# Patient Record
Sex: FEMALE | Race: WHITE | Employment: OTHER | URBAN - METROPOLITAN AREA
[De-identification: names, ages, dates, MRNs, and addresses within clinical notes are randomized per-mention and may not be internally consistent; named-entity substitution may affect disease eponyms.]

---

## 2017-01-03 RX ORDER — MEMANTINE HYDROCHLORIDE 7 MG/1
7 CAPSULE, EXTENDED RELEASE ORAL DAILY
Qty: 90 CAPSULE | Refills: 1 | Status: SHIPPED | OUTPATIENT
Start: 2017-01-03 | End: 2017-06-14 | Stop reason: SDUPTHER

## 2017-01-04 ENCOUNTER — TELEPHONE (OUTPATIENT)
Dept: NEUROLOGY | Age: 72
End: 2017-01-04

## 2017-03-13 ENCOUNTER — TELEPHONE (OUTPATIENT)
Dept: FAMILY MEDICINE CLINIC | Age: 72
End: 2017-03-13

## 2017-03-13 ENCOUNTER — TELEPHONE (OUTPATIENT)
Dept: NEUROLOGY | Age: 72
End: 2017-03-13

## 2017-03-24 ENCOUNTER — OFFICE VISIT (OUTPATIENT)
Dept: FAMILY MEDICINE CLINIC | Age: 72
End: 2017-03-24

## 2017-03-24 VITALS
RESPIRATION RATE: 16 BRPM | WEIGHT: 131 LBS | OXYGEN SATURATION: 97 % | SYSTOLIC BLOOD PRESSURE: 118 MMHG | TEMPERATURE: 98.1 F | BODY MASS INDEX: 24.11 KG/M2 | HEART RATE: 78 BPM | HEIGHT: 62 IN | DIASTOLIC BLOOD PRESSURE: 72 MMHG

## 2017-03-24 DIAGNOSIS — I10 ESSENTIAL HYPERTENSION: Primary | ICD-10-CM

## 2017-03-24 DIAGNOSIS — F02.80 ALZHEIMER'S DISEASE OF OTHER ONSET: ICD-10-CM

## 2017-03-24 DIAGNOSIS — E78.5 HYPERLIPIDEMIA, UNSPECIFIED HYPERLIPIDEMIA TYPE: ICD-10-CM

## 2017-03-24 DIAGNOSIS — G30.8 ALZHEIMER'S DISEASE OF OTHER ONSET: ICD-10-CM

## 2017-03-24 DIAGNOSIS — Z72.0 TOBACCO USE: ICD-10-CM

## 2017-03-24 DIAGNOSIS — M15.9 PRIMARY OSTEOARTHRITIS INVOLVING MULTIPLE JOINTS: ICD-10-CM

## 2017-03-24 LAB
ALBUMIN SERPL-MCNC: 4.4 G/DL (ref 3.4–5)
ALP BLD-CCNC: 75 U/L (ref 40–129)
ALT SERPL-CCNC: 11 U/L (ref 10–40)
ANION GAP SERPL CALCULATED.3IONS-SCNC: 13 MMOL/L (ref 3–16)
AST SERPL-CCNC: 15 U/L (ref 15–37)
BILIRUB SERPL-MCNC: 0.3 MG/DL (ref 0–1)
BILIRUBIN DIRECT: <0.2 MG/DL (ref 0–0.3)
BILIRUBIN, INDIRECT: NORMAL MG/DL (ref 0–1)
BUN BLDV-MCNC: 16 MG/DL (ref 7–20)
CALCIUM SERPL-MCNC: 9.7 MG/DL (ref 8.3–10.6)
CHLORIDE BLD-SCNC: 95 MMOL/L (ref 99–110)
CHOLESTEROL, TOTAL: 170 MG/DL (ref 0–199)
CO2: 27 MMOL/L (ref 21–32)
CREAT SERPL-MCNC: 0.9 MG/DL (ref 0.6–1.2)
GFR AFRICAN AMERICAN: >60
GFR NON-AFRICAN AMERICAN: >60
GLUCOSE BLD-MCNC: 101 MG/DL (ref 70–99)
HDLC SERPL-MCNC: 86 MG/DL (ref 40–60)
LDL CHOLESTEROL CALCULATED: 68 MG/DL
POTASSIUM SERPL-SCNC: 4.9 MMOL/L (ref 3.5–5.1)
SODIUM BLD-SCNC: 135 MMOL/L (ref 136–145)
TOTAL PROTEIN: 7 G/DL (ref 6.4–8.2)
TRIGL SERPL-MCNC: 81 MG/DL (ref 0–150)
VLDLC SERPL CALC-MCNC: 16 MG/DL

## 2017-03-24 PROCEDURE — 36415 COLL VENOUS BLD VENIPUNCTURE: CPT | Performed by: FAMILY MEDICINE

## 2017-03-24 PROCEDURE — 99214 OFFICE O/P EST MOD 30 MIN: CPT | Performed by: FAMILY MEDICINE

## 2017-03-24 RX ORDER — LISINOPRIL 20 MG/1
20 TABLET ORAL DAILY
Qty: 90 TABLET | Refills: 1 | Status: SHIPPED | OUTPATIENT
Start: 2017-03-24 | End: 2017-09-07 | Stop reason: SDUPTHER

## 2017-03-24 RX ORDER — SIMVASTATIN 40 MG
TABLET ORAL
Qty: 90 TABLET | Refills: 1 | Status: SHIPPED | OUTPATIENT
Start: 2017-03-24 | End: 2017-09-07 | Stop reason: SDUPTHER

## 2017-03-24 RX ORDER — ETODOLAC 400 MG/1
400 TABLET, FILM COATED ORAL 2 TIMES DAILY
Qty: 180 TABLET | Refills: 1 | Status: SHIPPED | OUTPATIENT
Start: 2017-03-24 | End: 2017-09-07 | Stop reason: SDUPTHER

## 2017-03-24 RX ORDER — METOPROLOL TARTRATE 100 MG/1
TABLET ORAL
Qty: 90 TABLET | Refills: 1 | Status: SHIPPED | OUTPATIENT
Start: 2017-03-24 | End: 2017-09-07 | Stop reason: SDUPTHER

## 2017-03-24 RX ORDER — AMLODIPINE BESYLATE 10 MG/1
10 TABLET ORAL DAILY
Qty: 90 TABLET | Refills: 1 | Status: SHIPPED | OUTPATIENT
Start: 2017-03-24 | End: 2017-09-07 | Stop reason: SDUPTHER

## 2017-06-14 RX ORDER — MEMANTINE HYDROCHLORIDE 7 MG/1
7 CAPSULE, EXTENDED RELEASE ORAL DAILY
Qty: 90 CAPSULE | Refills: 1 | Status: SHIPPED | OUTPATIENT
Start: 2017-06-14 | End: 2017-12-21 | Stop reason: SDUPTHER

## 2017-09-07 ENCOUNTER — OFFICE VISIT (OUTPATIENT)
Dept: NEUROLOGY | Age: 72
End: 2017-09-07

## 2017-09-07 ENCOUNTER — OFFICE VISIT (OUTPATIENT)
Dept: FAMILY MEDICINE CLINIC | Age: 72
End: 2017-09-07

## 2017-09-07 VITALS
WEIGHT: 126 LBS | HEIGHT: 62 IN | SYSTOLIC BLOOD PRESSURE: 142 MMHG | HEART RATE: 65 BPM | OXYGEN SATURATION: 97 % | BODY MASS INDEX: 23.19 KG/M2 | DIASTOLIC BLOOD PRESSURE: 80 MMHG

## 2017-09-07 VITALS
HEART RATE: 65 BPM | WEIGHT: 125 LBS | OXYGEN SATURATION: 98 % | BODY MASS INDEX: 23 KG/M2 | DIASTOLIC BLOOD PRESSURE: 101 MMHG | HEIGHT: 62 IN | SYSTOLIC BLOOD PRESSURE: 179 MMHG

## 2017-09-07 DIAGNOSIS — G30.1 LATE ONSET ALZHEIMER'S DISEASE WITH BEHAVIORAL DISTURBANCE (HCC): Primary | ICD-10-CM

## 2017-09-07 DIAGNOSIS — I10 ESSENTIAL HYPERTENSION: ICD-10-CM

## 2017-09-07 DIAGNOSIS — F02.818 LATE ONSET ALZHEIMER'S DISEASE WITH BEHAVIORAL DISTURBANCE (HCC): Primary | ICD-10-CM

## 2017-09-07 DIAGNOSIS — I25.9 CHRONIC ISCHEMIC HEART DISEASE: ICD-10-CM

## 2017-09-07 DIAGNOSIS — Z72.0 TOBACCO USE: ICD-10-CM

## 2017-09-07 DIAGNOSIS — E78.5 HYPERLIPIDEMIA, UNSPECIFIED HYPERLIPIDEMIA TYPE: ICD-10-CM

## 2017-09-07 DIAGNOSIS — F32.89 DEPRESSIVE DISORDER, NOT ELSEWHERE CLASSIFIED: ICD-10-CM

## 2017-09-07 DIAGNOSIS — M15.9 PRIMARY OSTEOARTHRITIS INVOLVING MULTIPLE JOINTS: ICD-10-CM

## 2017-09-07 DIAGNOSIS — I10 ESSENTIAL HYPERTENSION: Primary | ICD-10-CM

## 2017-09-07 LAB
ALBUMIN SERPL-MCNC: 4.2 G/DL (ref 3.4–5)
ALP BLD-CCNC: 66 U/L (ref 40–129)
ALT SERPL-CCNC: 13 U/L (ref 10–40)
ANION GAP SERPL CALCULATED.3IONS-SCNC: 17 MMOL/L (ref 3–16)
AST SERPL-CCNC: 20 U/L (ref 15–37)
BILIRUB SERPL-MCNC: 0.4 MG/DL (ref 0–1)
BILIRUBIN DIRECT: <0.2 MG/DL (ref 0–0.3)
BILIRUBIN, INDIRECT: NORMAL MG/DL (ref 0–1)
BUN BLDV-MCNC: 13 MG/DL (ref 7–20)
CALCIUM SERPL-MCNC: 10.3 MG/DL (ref 8.3–10.6)
CHLORIDE BLD-SCNC: 87 MMOL/L (ref 99–110)
CHOLESTEROL, TOTAL: 187 MG/DL (ref 0–199)
CO2: 25 MMOL/L (ref 21–32)
CREAT SERPL-MCNC: 0.9 MG/DL (ref 0.6–1.2)
GFR AFRICAN AMERICAN: >60
GFR NON-AFRICAN AMERICAN: >60
GLUCOSE BLD-MCNC: 113 MG/DL (ref 70–99)
HDLC SERPL-MCNC: 102 MG/DL (ref 40–60)
LDL CHOLESTEROL CALCULATED: 74 MG/DL
POTASSIUM SERPL-SCNC: 4.5 MMOL/L (ref 3.5–5.1)
SODIUM BLD-SCNC: 129 MMOL/L (ref 136–145)
TOTAL PROTEIN: 7.1 G/DL (ref 6.4–8.2)
TRIGL SERPL-MCNC: 57 MG/DL (ref 0–150)
VLDLC SERPL CALC-MCNC: 11 MG/DL

## 2017-09-07 PROCEDURE — 99214 OFFICE O/P EST MOD 30 MIN: CPT | Performed by: PSYCHIATRY & NEUROLOGY

## 2017-09-07 PROCEDURE — 36415 COLL VENOUS BLD VENIPUNCTURE: CPT | Performed by: FAMILY MEDICINE

## 2017-09-07 PROCEDURE — 99214 OFFICE O/P EST MOD 30 MIN: CPT | Performed by: FAMILY MEDICINE

## 2017-09-07 RX ORDER — CITALOPRAM 20 MG/1
20 TABLET ORAL DAILY
Qty: 30 TABLET | Refills: 3 | Status: SHIPPED | OUTPATIENT
Start: 2017-09-07 | End: 2017-12-21 | Stop reason: SDUPTHER

## 2017-09-07 RX ORDER — ETODOLAC 400 MG/1
400 TABLET, FILM COATED ORAL 2 TIMES DAILY
Qty: 180 TABLET | Refills: 1 | Status: SHIPPED | OUTPATIENT
Start: 2017-09-07 | End: 2018-01-30 | Stop reason: SDUPTHER

## 2017-09-07 RX ORDER — METOPROLOL TARTRATE 100 MG/1
TABLET ORAL
Qty: 90 TABLET | Refills: 1 | Status: SHIPPED | OUTPATIENT
Start: 2017-09-07 | End: 2018-01-30 | Stop reason: SDUPTHER

## 2017-09-07 RX ORDER — LISINOPRIL 20 MG/1
20 TABLET ORAL DAILY
Qty: 90 TABLET | Refills: 1 | Status: SHIPPED | OUTPATIENT
Start: 2017-09-07 | End: 2018-01-30 | Stop reason: SDUPTHER

## 2017-09-07 RX ORDER — SIMVASTATIN 40 MG
TABLET ORAL
Qty: 90 TABLET | Refills: 1 | Status: SHIPPED | OUTPATIENT
Start: 2017-09-07 | End: 2018-01-30 | Stop reason: SDUPTHER

## 2017-09-07 RX ORDER — AMLODIPINE BESYLATE 10 MG/1
10 TABLET ORAL DAILY
Qty: 90 TABLET | Refills: 1 | Status: SHIPPED | OUTPATIENT
Start: 2017-09-07 | End: 2018-01-30 | Stop reason: SDUPTHER

## 2017-09-07 ASSESSMENT — ENCOUNTER SYMPTOMS
NAUSEA: 0
DOUBLE VISION: 0
BLURRED VISION: 0
BACK PAIN: 1
ABDOMINAL PAIN: 0
VOMITING: 0
COUGH: 0

## 2017-12-22 RX ORDER — CITALOPRAM 20 MG/1
TABLET ORAL
Qty: 90 TABLET | Refills: 1 | Status: SHIPPED | OUTPATIENT
Start: 2017-12-22 | End: 2018-03-19 | Stop reason: SDUPTHER

## 2017-12-22 RX ORDER — MEMANTINE HYDROCHLORIDE 7 MG/1
CAPSULE, EXTENDED RELEASE ORAL
Qty: 90 CAPSULE | Refills: 1 | Status: SHIPPED | OUTPATIENT
Start: 2017-12-22 | End: 2018-03-19 | Stop reason: SDUPTHER

## 2018-01-30 ENCOUNTER — OFFICE VISIT (OUTPATIENT)
Dept: FAMILY MEDICINE CLINIC | Age: 73
End: 2018-01-30

## 2018-01-30 VITALS
DIASTOLIC BLOOD PRESSURE: 78 MMHG | RESPIRATION RATE: 16 BRPM | HEIGHT: 62 IN | OXYGEN SATURATION: 97 % | WEIGHT: 126 LBS | BODY MASS INDEX: 23.19 KG/M2 | SYSTOLIC BLOOD PRESSURE: 130 MMHG | HEART RATE: 60 BPM

## 2018-01-30 DIAGNOSIS — M15.9 PRIMARY OSTEOARTHRITIS INVOLVING MULTIPLE JOINTS: ICD-10-CM

## 2018-01-30 DIAGNOSIS — G30.1 LATE ONSET ALZHEIMER'S DISEASE WITH BEHAVIORAL DISTURBANCE (HCC): ICD-10-CM

## 2018-01-30 DIAGNOSIS — F02.818 LATE ONSET ALZHEIMER'S DISEASE WITH BEHAVIORAL DISTURBANCE (HCC): ICD-10-CM

## 2018-01-30 DIAGNOSIS — F32.A DEPRESSION, UNSPECIFIED DEPRESSION TYPE: ICD-10-CM

## 2018-01-30 DIAGNOSIS — F17.200 TOBACCO DEPENDENCE: ICD-10-CM

## 2018-01-30 DIAGNOSIS — E78.5 HYPERLIPIDEMIA, UNSPECIFIED HYPERLIPIDEMIA TYPE: ICD-10-CM

## 2018-01-30 DIAGNOSIS — M81.0 AGE-RELATED OSTEOPOROSIS WITHOUT CURRENT PATHOLOGICAL FRACTURE: ICD-10-CM

## 2018-01-30 DIAGNOSIS — I10 ESSENTIAL HYPERTENSION: ICD-10-CM

## 2018-01-30 DIAGNOSIS — Z78.0 POST-MENOPAUSAL: ICD-10-CM

## 2018-01-30 DIAGNOSIS — Z11.59 NEED FOR HEPATITIS C SCREENING TEST: ICD-10-CM

## 2018-01-30 LAB
A/G RATIO: 1.5 (ref 1.1–2.2)
ALBUMIN SERPL-MCNC: 4.3 G/DL (ref 3.4–5)
ALP BLD-CCNC: 70 U/L (ref 40–129)
ALT SERPL-CCNC: 13 U/L (ref 10–40)
ANION GAP SERPL CALCULATED.3IONS-SCNC: 13 MMOL/L (ref 3–16)
AST SERPL-CCNC: 16 U/L (ref 15–37)
BASOPHILS ABSOLUTE: 0.1 K/UL (ref 0–0.2)
BASOPHILS RELATIVE PERCENT: 0.5 %
BILIRUB SERPL-MCNC: 0.4 MG/DL (ref 0–1)
BUN BLDV-MCNC: 26 MG/DL (ref 7–20)
CALCIUM SERPL-MCNC: 9.9 MG/DL (ref 8.3–10.6)
CHLORIDE BLD-SCNC: 92 MMOL/L (ref 99–110)
CO2: 28 MMOL/L (ref 21–32)
CREAT SERPL-MCNC: 0.9 MG/DL (ref 0.6–1.2)
EOSINOPHILS ABSOLUTE: 0.4 K/UL (ref 0–0.6)
EOSINOPHILS RELATIVE PERCENT: 3.6 %
GFR AFRICAN AMERICAN: >60
GFR NON-AFRICAN AMERICAN: >60
GLOBULIN: 2.9 G/DL
GLUCOSE BLD-MCNC: 88 MG/DL (ref 70–99)
HCT VFR BLD CALC: 33.9 % (ref 36–48)
HEMOGLOBIN: 11.2 G/DL (ref 12–16)
LYMPHOCYTES ABSOLUTE: 2.6 K/UL (ref 1–5.1)
LYMPHOCYTES RELATIVE PERCENT: 24.5 %
MCH RBC QN AUTO: 30 PG (ref 26–34)
MCHC RBC AUTO-ENTMCNC: 33.1 G/DL (ref 31–36)
MCV RBC AUTO: 90.7 FL (ref 80–100)
MONOCYTES ABSOLUTE: 0.8 K/UL (ref 0–1.3)
MONOCYTES RELATIVE PERCENT: 7.1 %
NEUTROPHILS ABSOLUTE: 6.8 K/UL (ref 1.7–7.7)
NEUTROPHILS RELATIVE PERCENT: 64.3 %
PDW BLD-RTO: 16 % (ref 12.4–15.4)
PLATELET # BLD: 411 K/UL (ref 135–450)
PMV BLD AUTO: 8.7 FL (ref 5–10.5)
POTASSIUM SERPL-SCNC: 4.8 MMOL/L (ref 3.5–5.1)
RBC # BLD: 3.74 M/UL (ref 4–5.2)
SODIUM BLD-SCNC: 133 MMOL/L (ref 136–145)
TOTAL PROTEIN: 7.2 G/DL (ref 6.4–8.2)
WBC # BLD: 10.7 K/UL (ref 4–11)

## 2018-01-30 PROCEDURE — G8427 DOCREV CUR MEDS BY ELIG CLIN: HCPCS | Performed by: NURSE PRACTITIONER

## 2018-01-30 PROCEDURE — 99214 OFFICE O/P EST MOD 30 MIN: CPT | Performed by: NURSE PRACTITIONER

## 2018-01-30 PROCEDURE — 3014F SCREEN MAMMO DOC REV: CPT | Performed by: NURSE PRACTITIONER

## 2018-01-30 PROCEDURE — G8399 PT W/DXA RESULTS DOCUMENT: HCPCS | Performed by: NURSE PRACTITIONER

## 2018-01-30 PROCEDURE — 3017F COLORECTAL CA SCREEN DOC REV: CPT | Performed by: NURSE PRACTITIONER

## 2018-01-30 PROCEDURE — G8420 CALC BMI NORM PARAMETERS: HCPCS | Performed by: NURSE PRACTITIONER

## 2018-01-30 PROCEDURE — 1090F PRES/ABSN URINE INCON ASSESS: CPT | Performed by: NURSE PRACTITIONER

## 2018-01-30 PROCEDURE — 1123F ACP DISCUSS/DSCN MKR DOCD: CPT | Performed by: NURSE PRACTITIONER

## 2018-01-30 PROCEDURE — 4004F PT TOBACCO SCREEN RCVD TLK: CPT | Performed by: NURSE PRACTITIONER

## 2018-01-30 PROCEDURE — G8484 FLU IMMUNIZE NO ADMIN: HCPCS | Performed by: NURSE PRACTITIONER

## 2018-01-30 PROCEDURE — 4040F PNEUMOC VAC/ADMIN/RCVD: CPT | Performed by: NURSE PRACTITIONER

## 2018-01-30 PROCEDURE — G8598 ASA/ANTIPLAT THER USED: HCPCS | Performed by: NURSE PRACTITIONER

## 2018-01-30 RX ORDER — ETODOLAC 400 MG/1
400 TABLET, FILM COATED ORAL DAILY
Qty: 90 TABLET | Refills: 1 | Status: SHIPPED | OUTPATIENT
Start: 2018-01-30 | End: 2018-07-02 | Stop reason: SDUPTHER

## 2018-01-30 RX ORDER — METOPROLOL TARTRATE 100 MG/1
TABLET ORAL
Qty: 90 TABLET | Refills: 1 | Status: SHIPPED | OUTPATIENT
Start: 2018-01-30 | End: 2018-09-06 | Stop reason: SDUPTHER

## 2018-01-30 RX ORDER — AMLODIPINE BESYLATE 10 MG/1
10 TABLET ORAL DAILY
Qty: 90 TABLET | Refills: 1 | Status: SHIPPED | OUTPATIENT
Start: 2018-01-30 | End: 2018-09-06 | Stop reason: SDUPTHER

## 2018-01-30 RX ORDER — SIMVASTATIN 40 MG
TABLET ORAL
Qty: 90 TABLET | Refills: 1 | Status: SHIPPED | OUTPATIENT
Start: 2018-01-30 | End: 2018-09-06 | Stop reason: SDUPTHER

## 2018-01-30 RX ORDER — LISINOPRIL 20 MG/1
20 TABLET ORAL DAILY
Qty: 90 TABLET | Refills: 1 | Status: SHIPPED | OUTPATIENT
Start: 2018-01-30 | End: 2018-09-06 | Stop reason: SDUPTHER

## 2018-01-30 ASSESSMENT — PATIENT HEALTH QUESTIONNAIRE - PHQ9
SUM OF ALL RESPONSES TO PHQ9 QUESTIONS 1 & 2: 0
2. FEELING DOWN, DEPRESSED OR HOPELESS: 0
SUM OF ALL RESPONSES TO PHQ QUESTIONS 1-9: 0
1. LITTLE INTEREST OR PLEASURE IN DOING THINGS: 0

## 2018-01-30 NOTE — LETTER
[facility]  [address]  Phone: [phone]  Fax: [phone]    Solange Agustin NP        February 12, 2018    Marjorie Hoskins [place] [id number]      Dear Sharon Whyte:    The office has made multiple attempts to contact you regarding results. Please contact the office for this information. If you have any questions or concerns, please don't hesitate to call.     Sincerely,        Solange Agustin NP

## 2018-01-31 DIAGNOSIS — D64.9 ANEMIA, UNSPECIFIED TYPE: Primary | ICD-10-CM

## 2018-01-31 LAB
FERRITIN: 46.2 NG/ML (ref 15–150)
FOLATE: >20 NG/ML (ref 4.78–24.2)
HEPATITIS C ANTIBODY INTERPRETATION: NORMAL
IRON SATURATION: 12 % (ref 15–50)
IRON: 51 UG/DL (ref 37–145)
TOTAL IRON BINDING CAPACITY: 420 UG/DL (ref 260–445)
TSH REFLEX: 1.18 UIU/ML (ref 0.27–4.2)
VITAMIN B-12: 768 PG/ML (ref 211–911)

## 2018-02-04 ASSESSMENT — ENCOUNTER SYMPTOMS
DIARRHEA: 0
VOMITING: 0
WHEEZING: 0
ORTHOPNEA: 0
BLOOD IN STOOL: 0
SHORTNESS OF BREATH: 0
HEARTBURN: 0
NAUSEA: 0
CONSTIPATION: 0
COUGH: 0
ABDOMINAL PAIN: 0

## 2018-02-04 NOTE — PROGRESS NOTES
claudication, leg swelling and PND. Gastrointestinal: Negative for abdominal pain, blood in stool, constipation, diarrhea, heartburn, nausea and vomiting. Genitourinary: Negative for dysuria and hematuria. Musculoskeletal: Positive for joint pain. Neurological: Negative for dizziness, focal weakness, weakness and headaches. Psychiatric/Behavioral: Positive for memory loss. Negative for depression. The patient is not nervous/anxious. Physical Exam   Constitutional: She is oriented to person, place, and time. She appears well-developed and well-nourished. No distress. Eyes: No scleral icterus. Neck: Neck supple. No thyromegaly present. Cardiovascular: Normal rate, regular rhythm, normal heart sounds and intact distal pulses. No edema   Pulmonary/Chest: Effort normal and breath sounds normal.   Abdominal: Soft. Bowel sounds are normal.   Lymphadenopathy:     She has no cervical adenopathy. Neurological: She is alert and oriented to person, place, and time. Psychiatric: Cognition and memory are impaired. Nursing note and vitals reviewed. Vitals:    01/30/18 1344   BP: 130/78   Pulse:    Resp:    SpO2:        Assessment    1. Essential hypertension    2. Late onset Alzheimer's disease with behavioral disturbance    3. Hyperlipidemia, unspecified hyperlipidemia type    4. Depression, unspecified depression type    5. Primary osteoarthritis involving multiple joints    6. Post-menopausal    7. Age-related osteoporosis without current pathological fracture    8. Need for hepatitis C screening test        Plan    Anil Soto was seen today for hypertension, hyperlipidemia, dementia and new patient. Diagnoses and all orders for this visit:    Essential hypertension  -     Comprehensive Metabolic Panel  -     TSH with Reflex  -     CBC Auto Differential  -     lisinopril (PRINIVIL;ZESTRIL) 20 MG tablet;  Take 1 tablet by mouth daily  -     metoprolol (LOPRESSOR) 100 MG tablet; TAKE 1 TABLET EVERY

## 2018-02-16 ENCOUNTER — TELEPHONE (OUTPATIENT)
Dept: FAMILY MEDICINE CLINIC | Age: 73
End: 2018-02-16

## 2018-03-19 ENCOUNTER — OFFICE VISIT (OUTPATIENT)
Dept: NEUROLOGY | Age: 73
End: 2018-03-19

## 2018-03-19 VITALS
SYSTOLIC BLOOD PRESSURE: 155 MMHG | BODY MASS INDEX: 24.84 KG/M2 | HEART RATE: 65 BPM | HEIGHT: 62 IN | DIASTOLIC BLOOD PRESSURE: 90 MMHG | WEIGHT: 135 LBS | OXYGEN SATURATION: 98 %

## 2018-03-19 DIAGNOSIS — I10 ESSENTIAL HYPERTENSION: ICD-10-CM

## 2018-03-19 DIAGNOSIS — F34.1 DYSTHYMIA: ICD-10-CM

## 2018-03-19 DIAGNOSIS — Z72.0 TOBACCO USE: ICD-10-CM

## 2018-03-19 DIAGNOSIS — G30.1 LATE ONSET ALZHEIMER'S DISEASE WITH BEHAVIORAL DISTURBANCE (HCC): Primary | ICD-10-CM

## 2018-03-19 DIAGNOSIS — F02.818 LATE ONSET ALZHEIMER'S DISEASE WITH BEHAVIORAL DISTURBANCE (HCC): Primary | ICD-10-CM

## 2018-03-19 PROCEDURE — G8427 DOCREV CUR MEDS BY ELIG CLIN: HCPCS | Performed by: PSYCHIATRY & NEUROLOGY

## 2018-03-19 PROCEDURE — 1090F PRES/ABSN URINE INCON ASSESS: CPT | Performed by: PSYCHIATRY & NEUROLOGY

## 2018-03-19 PROCEDURE — 4004F PT TOBACCO SCREEN RCVD TLK: CPT | Performed by: PSYCHIATRY & NEUROLOGY

## 2018-03-19 PROCEDURE — 3014F SCREEN MAMMO DOC REV: CPT | Performed by: PSYCHIATRY & NEUROLOGY

## 2018-03-19 PROCEDURE — G8482 FLU IMMUNIZE ORDER/ADMIN: HCPCS | Performed by: PSYCHIATRY & NEUROLOGY

## 2018-03-19 PROCEDURE — G8420 CALC BMI NORM PARAMETERS: HCPCS | Performed by: PSYCHIATRY & NEUROLOGY

## 2018-03-19 PROCEDURE — 4040F PNEUMOC VAC/ADMIN/RCVD: CPT | Performed by: PSYCHIATRY & NEUROLOGY

## 2018-03-19 PROCEDURE — 99214 OFFICE O/P EST MOD 30 MIN: CPT | Performed by: PSYCHIATRY & NEUROLOGY

## 2018-03-19 PROCEDURE — G8598 ASA/ANTIPLAT THER USED: HCPCS | Performed by: PSYCHIATRY & NEUROLOGY

## 2018-03-19 PROCEDURE — G8399 PT W/DXA RESULTS DOCUMENT: HCPCS | Performed by: PSYCHIATRY & NEUROLOGY

## 2018-03-19 PROCEDURE — 1123F ACP DISCUSS/DSCN MKR DOCD: CPT | Performed by: PSYCHIATRY & NEUROLOGY

## 2018-03-19 PROCEDURE — 3017F COLORECTAL CA SCREEN DOC REV: CPT | Performed by: PSYCHIATRY & NEUROLOGY

## 2018-03-19 RX ORDER — CITALOPRAM 20 MG/1
TABLET ORAL
Qty: 90 TABLET | Refills: 2 | Status: SHIPPED | OUTPATIENT
Start: 2018-03-19 | End: 2019-03-14 | Stop reason: SDUPTHER

## 2018-03-19 RX ORDER — MEMANTINE HYDROCHLORIDE 7 MG/1
CAPSULE, EXTENDED RELEASE ORAL
Qty: 90 CAPSULE | Refills: 1 | Status: SHIPPED | OUTPATIENT
Start: 2018-03-19 | End: 2018-10-29 | Stop reason: SDUPTHER

## 2018-05-02 ENCOUNTER — OFFICE VISIT (OUTPATIENT)
Dept: FAMILY MEDICINE CLINIC | Age: 73
End: 2018-05-02

## 2018-05-02 VITALS
RESPIRATION RATE: 16 BRPM | OXYGEN SATURATION: 98 % | HEART RATE: 69 BPM | HEIGHT: 62 IN | WEIGHT: 133.2 LBS | BODY MASS INDEX: 24.51 KG/M2 | SYSTOLIC BLOOD PRESSURE: 118 MMHG | DIASTOLIC BLOOD PRESSURE: 70 MMHG

## 2018-05-02 DIAGNOSIS — R07.9 CHEST PAIN, UNSPECIFIED TYPE: Primary | ICD-10-CM

## 2018-05-02 DIAGNOSIS — G30.1 LATE ONSET ALZHEIMER'S DISEASE WITH BEHAVIORAL DISTURBANCE (HCC): ICD-10-CM

## 2018-05-02 DIAGNOSIS — M79.10 MUSCULAR PAIN: ICD-10-CM

## 2018-05-02 DIAGNOSIS — F02.818 LATE ONSET ALZHEIMER'S DISEASE WITH BEHAVIORAL DISTURBANCE (HCC): ICD-10-CM

## 2018-05-02 PROCEDURE — G8420 CALC BMI NORM PARAMETERS: HCPCS | Performed by: NURSE PRACTITIONER

## 2018-05-02 PROCEDURE — 1090F PRES/ABSN URINE INCON ASSESS: CPT | Performed by: NURSE PRACTITIONER

## 2018-05-02 PROCEDURE — 3017F COLORECTAL CA SCREEN DOC REV: CPT | Performed by: NURSE PRACTITIONER

## 2018-05-02 PROCEDURE — 4004F PT TOBACCO SCREEN RCVD TLK: CPT | Performed by: NURSE PRACTITIONER

## 2018-05-02 PROCEDURE — G8427 DOCREV CUR MEDS BY ELIG CLIN: HCPCS | Performed by: NURSE PRACTITIONER

## 2018-05-02 PROCEDURE — 1123F ACP DISCUSS/DSCN MKR DOCD: CPT | Performed by: NURSE PRACTITIONER

## 2018-05-02 PROCEDURE — 93000 ELECTROCARDIOGRAM COMPLETE: CPT | Performed by: NURSE PRACTITIONER

## 2018-05-02 PROCEDURE — G8399 PT W/DXA RESULTS DOCUMENT: HCPCS | Performed by: NURSE PRACTITIONER

## 2018-05-02 PROCEDURE — G8598 ASA/ANTIPLAT THER USED: HCPCS | Performed by: NURSE PRACTITIONER

## 2018-05-02 PROCEDURE — 99213 OFFICE O/P EST LOW 20 MIN: CPT | Performed by: NURSE PRACTITIONER

## 2018-05-02 PROCEDURE — 4040F PNEUMOC VAC/ADMIN/RCVD: CPT | Performed by: NURSE PRACTITIONER

## 2018-05-02 ASSESSMENT — ENCOUNTER SYMPTOMS
DIARRHEA: 0
COUGH: 0
BACK PAIN: 0
CHEST TIGHTNESS: 0
SHORTNESS OF BREATH: 0
NAUSEA: 0
CONSTIPATION: 1
VOMITING: 0

## 2018-05-02 ASSESSMENT — PATIENT HEALTH QUESTIONNAIRE - PHQ9
2. FEELING DOWN, DEPRESSED OR HOPELESS: 0
1. LITTLE INTEREST OR PLEASURE IN DOING THINGS: 0
SUM OF ALL RESPONSES TO PHQ9 QUESTIONS 1 & 2: 0
SUM OF ALL RESPONSES TO PHQ QUESTIONS 1-9: 0

## 2018-07-02 ENCOUNTER — OFFICE VISIT (OUTPATIENT)
Dept: FAMILY MEDICINE CLINIC | Age: 73
End: 2018-07-02

## 2018-07-02 VITALS
HEART RATE: 82 BPM | RESPIRATION RATE: 16 BRPM | DIASTOLIC BLOOD PRESSURE: 78 MMHG | BODY MASS INDEX: 25.76 KG/M2 | HEIGHT: 62 IN | OXYGEN SATURATION: 98 % | WEIGHT: 140 LBS | SYSTOLIC BLOOD PRESSURE: 134 MMHG

## 2018-07-02 DIAGNOSIS — E78.5 HYPERLIPIDEMIA, UNSPECIFIED HYPERLIPIDEMIA TYPE: ICD-10-CM

## 2018-07-02 DIAGNOSIS — F02.818 LATE ONSET ALZHEIMER'S DISEASE WITH BEHAVIORAL DISTURBANCE (HCC): ICD-10-CM

## 2018-07-02 DIAGNOSIS — I10 ESSENTIAL HYPERTENSION: ICD-10-CM

## 2018-07-02 DIAGNOSIS — G30.1 LATE ONSET ALZHEIMER'S DISEASE WITH BEHAVIORAL DISTURBANCE (HCC): ICD-10-CM

## 2018-07-02 DIAGNOSIS — D64.9 ANEMIA, UNSPECIFIED TYPE: ICD-10-CM

## 2018-07-02 DIAGNOSIS — F34.1 DYSTHYMIA: ICD-10-CM

## 2018-07-02 DIAGNOSIS — Z12.39 SCREENING FOR BREAST CANCER: ICD-10-CM

## 2018-07-02 DIAGNOSIS — M15.9 PRIMARY OSTEOARTHRITIS INVOLVING MULTIPLE JOINTS: ICD-10-CM

## 2018-07-02 LAB
BASOPHILS ABSOLUTE: 0.1 K/UL (ref 0–0.2)
BASOPHILS RELATIVE PERCENT: 0.7 %
CHOLESTEROL, TOTAL: 169 MG/DL (ref 0–199)
EOSINOPHILS ABSOLUTE: 0.3 K/UL (ref 0–0.6)
EOSINOPHILS RELATIVE PERCENT: 3.4 %
HCT VFR BLD CALC: 36.6 % (ref 36–48)
HDLC SERPL-MCNC: 89 MG/DL (ref 40–60)
HEMOGLOBIN: 12.3 G/DL (ref 12–16)
LDL CHOLESTEROL CALCULATED: 68 MG/DL
LYMPHOCYTES ABSOLUTE: 1.8 K/UL (ref 1–5.1)
LYMPHOCYTES RELATIVE PERCENT: 19.3 %
MCH RBC QN AUTO: 29.9 PG (ref 26–34)
MCHC RBC AUTO-ENTMCNC: 33.5 G/DL (ref 31–36)
MCV RBC AUTO: 89.2 FL (ref 80–100)
MONOCYTES ABSOLUTE: 0.9 K/UL (ref 0–1.3)
MONOCYTES RELATIVE PERCENT: 9.6 %
NEUTROPHILS ABSOLUTE: 6.2 K/UL (ref 1.7–7.7)
NEUTROPHILS RELATIVE PERCENT: 67 %
PDW BLD-RTO: 15.9 % (ref 12.4–15.4)
PLATELET # BLD: 412 K/UL (ref 135–450)
PMV BLD AUTO: 8.3 FL (ref 5–10.5)
RBC # BLD: 4.1 M/UL (ref 4–5.2)
TRIGL SERPL-MCNC: 60 MG/DL (ref 0–150)
VLDLC SERPL CALC-MCNC: 12 MG/DL
WBC # BLD: 9.2 K/UL (ref 4–11)

## 2018-07-02 PROCEDURE — 4004F PT TOBACCO SCREEN RCVD TLK: CPT | Performed by: NURSE PRACTITIONER

## 2018-07-02 PROCEDURE — 99214 OFFICE O/P EST MOD 30 MIN: CPT | Performed by: NURSE PRACTITIONER

## 2018-07-02 PROCEDURE — 4040F PNEUMOC VAC/ADMIN/RCVD: CPT | Performed by: NURSE PRACTITIONER

## 2018-07-02 PROCEDURE — 1090F PRES/ABSN URINE INCON ASSESS: CPT | Performed by: NURSE PRACTITIONER

## 2018-07-02 PROCEDURE — G8598 ASA/ANTIPLAT THER USED: HCPCS | Performed by: NURSE PRACTITIONER

## 2018-07-02 PROCEDURE — 1123F ACP DISCUSS/DSCN MKR DOCD: CPT | Performed by: NURSE PRACTITIONER

## 2018-07-02 PROCEDURE — G8399 PT W/DXA RESULTS DOCUMENT: HCPCS | Performed by: NURSE PRACTITIONER

## 2018-07-02 PROCEDURE — G8419 CALC BMI OUT NRM PARAM NOF/U: HCPCS | Performed by: NURSE PRACTITIONER

## 2018-07-02 PROCEDURE — G8427 DOCREV CUR MEDS BY ELIG CLIN: HCPCS | Performed by: NURSE PRACTITIONER

## 2018-07-02 PROCEDURE — 3017F COLORECTAL CA SCREEN DOC REV: CPT | Performed by: NURSE PRACTITIONER

## 2018-07-02 RX ORDER — ETODOLAC 400 MG/1
400 TABLET, FILM COATED ORAL DAILY
Qty: 90 TABLET | Refills: 1 | Status: SHIPPED | OUTPATIENT
Start: 2018-07-02 | End: 2019-01-03 | Stop reason: SDUPTHER

## 2018-07-02 ASSESSMENT — ENCOUNTER SYMPTOMS
DIARRHEA: 0
COUGH: 0
ABDOMINAL PAIN: 0
SHORTNESS OF BREATH: 0
BLOOD IN STOOL: 0
CONSTIPATION: 0

## 2018-07-02 ASSESSMENT — PATIENT HEALTH QUESTIONNAIRE - PHQ9
SUM OF ALL RESPONSES TO PHQ QUESTIONS 1-9: 2
1. LITTLE INTEREST OR PLEASURE IN DOING THINGS: 1
2. FEELING DOWN, DEPRESSED OR HOPELESS: 1
SUM OF ALL RESPONSES TO PHQ9 QUESTIONS 1 & 2: 2

## 2018-09-06 DIAGNOSIS — E78.5 HYPERLIPIDEMIA, UNSPECIFIED HYPERLIPIDEMIA TYPE: ICD-10-CM

## 2018-09-06 DIAGNOSIS — I10 ESSENTIAL HYPERTENSION: ICD-10-CM

## 2018-09-07 RX ORDER — AMLODIPINE BESYLATE 10 MG/1
10 TABLET ORAL DAILY
Qty: 90 TABLET | Refills: 1 | Status: SHIPPED | OUTPATIENT
Start: 2018-09-07 | End: 2019-03-14 | Stop reason: SDUPTHER

## 2018-09-07 RX ORDER — LISINOPRIL 20 MG/1
20 TABLET ORAL DAILY
Qty: 90 TABLET | Refills: 1 | Status: SHIPPED | OUTPATIENT
Start: 2018-09-07 | End: 2019-03-14 | Stop reason: SDUPTHER

## 2018-09-07 RX ORDER — SIMVASTATIN 40 MG
TABLET ORAL
Qty: 90 TABLET | Refills: 1 | Status: SHIPPED | OUTPATIENT
Start: 2018-09-07 | End: 2019-03-14 | Stop reason: SDUPTHER

## 2018-09-07 RX ORDER — METOPROLOL TARTRATE 100 MG/1
TABLET ORAL
Qty: 90 TABLET | Refills: 1 | Status: SHIPPED | OUTPATIENT
Start: 2018-09-07 | End: 2019-03-14 | Stop reason: SDUPTHER

## 2018-10-29 ENCOUNTER — HOSPITAL ENCOUNTER (OUTPATIENT)
Dept: WOMENS IMAGING | Age: 73
Discharge: HOME | End: 2018-10-29
Payer: MEDICARE

## 2018-10-29 ENCOUNTER — OFFICE VISIT (OUTPATIENT)
Dept: NEUROLOGY | Age: 73
End: 2018-10-29
Payer: MEDICARE

## 2018-10-29 VITALS
OXYGEN SATURATION: 97 % | HEIGHT: 62 IN | SYSTOLIC BLOOD PRESSURE: 136 MMHG | DIASTOLIC BLOOD PRESSURE: 76 MMHG | BODY MASS INDEX: 27.23 KG/M2 | HEART RATE: 63 BPM | WEIGHT: 148 LBS

## 2018-10-29 DIAGNOSIS — F17.200 SMOKING: ICD-10-CM

## 2018-10-29 DIAGNOSIS — F02.818 LATE ONSET ALZHEIMER'S DISEASE WITH BEHAVIORAL DISTURBANCE (HCC): Primary | ICD-10-CM

## 2018-10-29 DIAGNOSIS — F34.1 DYSTHYMIA: ICD-10-CM

## 2018-10-29 DIAGNOSIS — E78.5 DYSLIPIDEMIA: ICD-10-CM

## 2018-10-29 DIAGNOSIS — G30.1 LATE ONSET ALZHEIMER'S DISEASE WITH BEHAVIORAL DISTURBANCE (HCC): Primary | ICD-10-CM

## 2018-10-29 DIAGNOSIS — I10 ESSENTIAL HYPERTENSION: ICD-10-CM

## 2018-10-29 DIAGNOSIS — Z12.31 ENCOUNTER FOR SCREENING MAMMOGRAM FOR MALIGNANT NEOPLASM OF BREAST: ICD-10-CM

## 2018-10-29 PROCEDURE — G8484 FLU IMMUNIZE NO ADMIN: HCPCS | Performed by: PSYCHIATRY & NEUROLOGY

## 2018-10-29 PROCEDURE — G8427 DOCREV CUR MEDS BY ELIG CLIN: HCPCS | Performed by: PSYCHIATRY & NEUROLOGY

## 2018-10-29 PROCEDURE — 3017F COLORECTAL CA SCREEN DOC REV: CPT | Performed by: PSYCHIATRY & NEUROLOGY

## 2018-10-29 PROCEDURE — G8399 PT W/DXA RESULTS DOCUMENT: HCPCS | Performed by: PSYCHIATRY & NEUROLOGY

## 2018-10-29 PROCEDURE — 77063 BREAST TOMOSYNTHESIS BI: CPT

## 2018-10-29 PROCEDURE — G8419 CALC BMI OUT NRM PARAM NOF/U: HCPCS | Performed by: PSYCHIATRY & NEUROLOGY

## 2018-10-29 PROCEDURE — 4004F PT TOBACCO SCREEN RCVD TLK: CPT | Performed by: PSYCHIATRY & NEUROLOGY

## 2018-10-29 PROCEDURE — 99214 OFFICE O/P EST MOD 30 MIN: CPT | Performed by: PSYCHIATRY & NEUROLOGY

## 2018-10-29 PROCEDURE — 1090F PRES/ABSN URINE INCON ASSESS: CPT | Performed by: PSYCHIATRY & NEUROLOGY

## 2018-10-29 PROCEDURE — 1101F PT FALLS ASSESS-DOCD LE1/YR: CPT | Performed by: PSYCHIATRY & NEUROLOGY

## 2018-10-29 PROCEDURE — G8598 ASA/ANTIPLAT THER USED: HCPCS | Performed by: PSYCHIATRY & NEUROLOGY

## 2018-10-29 PROCEDURE — 4040F PNEUMOC VAC/ADMIN/RCVD: CPT | Performed by: PSYCHIATRY & NEUROLOGY

## 2018-10-29 PROCEDURE — 1123F ACP DISCUSS/DSCN MKR DOCD: CPT | Performed by: PSYCHIATRY & NEUROLOGY

## 2018-10-29 RX ORDER — MEMANTINE HYDROCHLORIDE 7 MG/1
CAPSULE, EXTENDED RELEASE ORAL
Qty: 90 CAPSULE | Refills: 1 | Status: SHIPPED | OUTPATIENT
Start: 2018-10-29 | End: 2019-03-14 | Stop reason: SDUPTHER

## 2018-10-29 NOTE — PROGRESS NOTES
Known Allergies  Social History   Substance Use Topics    Smoking status: Current Some Day Smoker     Packs/day: 0.25     Years: 20.00     Types: Cigarettes    Smokeless tobacco: Never Used      Comment: 0.5 or 2 cigs advised to quit    Alcohol use No     Family History   Problem Relation Age of Onset    Diabetes Father     Heart Disease Father      Past Surgical History:   Procedure Laterality Date    TONSILLECTOMY           Exam:   Constitutional:   Vitals:    10/29/18 1032   BP: 136/76   Pulse: 63   SpO2: 97%   Weight: 148 lb (67.1 kg)   Height: 5' 2\" (1.575 m)       General appearance: well-nourished. Eye: No icterus. No blurring of optic disc. Neck: supple  Cardiovascular: No carotid bruit. Heart: S1, S2         No lower leg edema with good pulsation. Mental Status: AAO times two. 2/3 recall. Poor insight. Fluent. Cranial Nerves:   II: Visual fields: Full to confrontation  III: Pupils: equal, round, reactive to light  III,IV,VI: Extra Ocular Movements are intact. No nystagmus  V: Facial sensation is intact to pin prick and light touch  VII: Facial strength and movements: intact and symmetric smile,cheek puffing and eyebrow elevation  VIII: Hearing: Intact to finger rub bilaterally  IX: Palate elevation is symmetric  XI: Shoulder shrug is intact  XII: Tongue movements are normal  Musculoskeletal: 5/5 in all 4 extremities. Normal tone. Reflexes: Bilateral biceps 2/4, triceps 2/4, brachial radialis 2/4, knee 2/4 and ankle 2/4. Planters: flexor bilaterally. Coordination: no pronator drift, no dysmetria. Finger nose finger testing within normal limits. Sensation: normal to all modalities.   Gait/Posture: steady    ROS: the patient denies any chest pain, dysphagia or dysarthria, weakness or numbness or tingling, neck or back pain, recent fever or chills or syncope, recent trauma, abdominal pain, diarrhea or constipation, joint pain, and other 12 points ROS reviewed with the patient which

## 2019-01-03 ENCOUNTER — TELEPHONE (OUTPATIENT)
Dept: FAMILY MEDICINE CLINIC | Age: 74
End: 2019-01-03

## 2019-01-03 DIAGNOSIS — M15.9 PRIMARY OSTEOARTHRITIS INVOLVING MULTIPLE JOINTS: ICD-10-CM

## 2019-01-03 RX ORDER — ETODOLAC 400 MG/1
400 TABLET, FILM COATED ORAL DAILY
Qty: 90 TABLET | Refills: 1 | Status: SHIPPED | OUTPATIENT
Start: 2019-01-03 | End: 2019-04-29

## 2019-01-07 ENCOUNTER — OFFICE VISIT (OUTPATIENT)
Dept: FAMILY MEDICINE CLINIC | Age: 74
End: 2019-01-07
Payer: MEDICARE

## 2019-01-07 VITALS
DIASTOLIC BLOOD PRESSURE: 76 MMHG | SYSTOLIC BLOOD PRESSURE: 138 MMHG | BODY MASS INDEX: 28.17 KG/M2 | OXYGEN SATURATION: 98 % | RESPIRATION RATE: 16 BRPM | WEIGHT: 154 LBS | HEART RATE: 60 BPM

## 2019-01-07 DIAGNOSIS — E78.5 HYPERLIPIDEMIA, UNSPECIFIED HYPERLIPIDEMIA TYPE: ICD-10-CM

## 2019-01-07 DIAGNOSIS — F02.818 LATE ONSET ALZHEIMER'S DISEASE WITH BEHAVIORAL DISTURBANCE (HCC): ICD-10-CM

## 2019-01-07 DIAGNOSIS — Z72.0 TOBACCO USE: ICD-10-CM

## 2019-01-07 DIAGNOSIS — G30.1 LATE ONSET ALZHEIMER'S DISEASE WITH BEHAVIORAL DISTURBANCE (HCC): ICD-10-CM

## 2019-01-07 DIAGNOSIS — M81.0 AGE-RELATED OSTEOPOROSIS WITHOUT CURRENT PATHOLOGICAL FRACTURE: ICD-10-CM

## 2019-01-07 DIAGNOSIS — I10 ESSENTIAL HYPERTENSION: ICD-10-CM

## 2019-01-07 DIAGNOSIS — M15.9 PRIMARY OSTEOARTHRITIS INVOLVING MULTIPLE JOINTS: ICD-10-CM

## 2019-01-07 PROCEDURE — 1101F PT FALLS ASSESS-DOCD LE1/YR: CPT | Performed by: NURSE PRACTITIONER

## 2019-01-07 PROCEDURE — 4040F PNEUMOC VAC/ADMIN/RCVD: CPT | Performed by: NURSE PRACTITIONER

## 2019-01-07 PROCEDURE — 36415 COLL VENOUS BLD VENIPUNCTURE: CPT | Performed by: NURSE PRACTITIONER

## 2019-01-07 PROCEDURE — 99214 OFFICE O/P EST MOD 30 MIN: CPT | Performed by: NURSE PRACTITIONER

## 2019-01-07 PROCEDURE — 3017F COLORECTAL CA SCREEN DOC REV: CPT | Performed by: NURSE PRACTITIONER

## 2019-01-07 PROCEDURE — 1123F ACP DISCUSS/DSCN MKR DOCD: CPT | Performed by: NURSE PRACTITIONER

## 2019-01-07 PROCEDURE — G8419 CALC BMI OUT NRM PARAM NOF/U: HCPCS | Performed by: NURSE PRACTITIONER

## 2019-01-07 PROCEDURE — G8482 FLU IMMUNIZE ORDER/ADMIN: HCPCS | Performed by: NURSE PRACTITIONER

## 2019-01-07 PROCEDURE — G8427 DOCREV CUR MEDS BY ELIG CLIN: HCPCS | Performed by: NURSE PRACTITIONER

## 2019-01-07 PROCEDURE — 1090F PRES/ABSN URINE INCON ASSESS: CPT | Performed by: NURSE PRACTITIONER

## 2019-01-07 PROCEDURE — 4004F PT TOBACCO SCREEN RCVD TLK: CPT | Performed by: NURSE PRACTITIONER

## 2019-01-07 PROCEDURE — G8399 PT W/DXA RESULTS DOCUMENT: HCPCS | Performed by: NURSE PRACTITIONER

## 2019-01-08 LAB
A/G RATIO: 1.4 (ref 1.1–2.2)
ALBUMIN SERPL-MCNC: 4.6 G/DL (ref 3.4–5)
ALP BLD-CCNC: 79 U/L (ref 40–129)
ALT SERPL-CCNC: 14 U/L (ref 10–40)
ANION GAP SERPL CALCULATED.3IONS-SCNC: 15 MMOL/L (ref 3–16)
AST SERPL-CCNC: 17 U/L (ref 15–37)
BILIRUB SERPL-MCNC: 0.3 MG/DL (ref 0–1)
BUN BLDV-MCNC: 26 MG/DL (ref 7–20)
CALCIUM SERPL-MCNC: 10 MG/DL (ref 8.3–10.6)
CHLORIDE BLD-SCNC: 96 MMOL/L (ref 99–110)
CO2: 25 MMOL/L (ref 21–32)
CREAT SERPL-MCNC: 1 MG/DL (ref 0.6–1.2)
GFR AFRICAN AMERICAN: >60
GFR NON-AFRICAN AMERICAN: 54
GLOBULIN: 3.2 G/DL
GLUCOSE BLD-MCNC: 77 MG/DL (ref 70–99)
POTASSIUM SERPL-SCNC: 5 MMOL/L (ref 3.5–5.1)
SODIUM BLD-SCNC: 136 MMOL/L (ref 136–145)
TOTAL PROTEIN: 7.8 G/DL (ref 6.4–8.2)

## 2019-01-09 ASSESSMENT — ENCOUNTER SYMPTOMS
SHORTNESS OF BREATH: 0
CHEST TIGHTNESS: 0
ABDOMINAL PAIN: 0
BLOOD IN STOOL: 0
COUGH: 0
COLOR CHANGE: 0
WHEEZING: 0

## 2019-03-02 ENCOUNTER — APPOINTMENT (OUTPATIENT)
Dept: GENERAL RADIOLOGY | Age: 74
End: 2019-03-02
Payer: MEDICARE

## 2019-03-02 ENCOUNTER — HOSPITAL ENCOUNTER (EMERGENCY)
Age: 74
Discharge: HOME | End: 2019-03-02
Attending: EMERGENCY MEDICINE
Payer: MEDICARE

## 2019-03-02 VITALS
DIASTOLIC BLOOD PRESSURE: 76 MMHG | HEIGHT: 61 IN | RESPIRATION RATE: 16 BRPM | WEIGHT: 150 LBS | HEART RATE: 66 BPM | OXYGEN SATURATION: 100 % | TEMPERATURE: 97.6 F | SYSTOLIC BLOOD PRESSURE: 140 MMHG | BODY MASS INDEX: 28.32 KG/M2

## 2019-03-02 DIAGNOSIS — S63.259A DISLOCATION OF FINGER, INITIAL ENCOUNTER: Primary | ICD-10-CM

## 2019-03-02 PROCEDURE — 4500000023 HC ED LEVEL 3 PROCEDURE

## 2019-03-02 PROCEDURE — 73130 X-RAY EXAM OF HAND: CPT

## 2019-03-02 PROCEDURE — 90471 IMMUNIZATION ADMIN: CPT | Performed by: PHYSICIAN ASSISTANT

## 2019-03-02 PROCEDURE — 90715 TDAP VACCINE 7 YRS/> IM: CPT | Performed by: PHYSICIAN ASSISTANT

## 2019-03-02 PROCEDURE — 6360000002 HC RX W HCPCS: Performed by: PHYSICIAN ASSISTANT

## 2019-03-02 PROCEDURE — 6370000000 HC RX 637 (ALT 250 FOR IP): Performed by: PHYSICIAN ASSISTANT

## 2019-03-02 PROCEDURE — 99283 EMERGENCY DEPT VISIT LOW MDM: CPT

## 2019-03-02 PROCEDURE — 73140 X-RAY EXAM OF FINGER(S): CPT

## 2019-03-02 RX ORDER — ACETAMINOPHEN 500 MG
500 TABLET ORAL ONCE
Status: COMPLETED | OUTPATIENT
Start: 2019-03-02 | End: 2019-03-02

## 2019-03-02 RX ADMIN — ACETAMINOPHEN 500 MG: 500 TABLET ORAL at 18:28

## 2019-03-02 RX ADMIN — TETANUS TOXOID, REDUCED DIPHTHERIA TOXOID AND ACELLULAR PERTUSSIS VACCINE, ADSORBED 0.5 ML: 5; 2.5; 8; 8; 2.5 SUSPENSION INTRAMUSCULAR at 18:31

## 2019-03-02 ASSESSMENT — PAIN SCALES - GENERAL
PAINLEVEL_OUTOF10: 3
PAINLEVEL_OUTOF10: 3
PAINLEVEL_OUTOF10: 2

## 2019-03-02 ASSESSMENT — PAIN DESCRIPTION - PROGRESSION: CLINICAL_PROGRESSION: GRADUALLY IMPROVING

## 2019-03-11 ENCOUNTER — TELEPHONE (OUTPATIENT)
Dept: ORTHOPEDIC SURGERY | Age: 74
End: 2019-03-11

## 2019-03-11 ENCOUNTER — OFFICE VISIT (OUTPATIENT)
Dept: ORTHOPEDIC SURGERY | Age: 74
End: 2019-03-11
Payer: MEDICARE

## 2019-03-11 VITALS — HEIGHT: 61 IN | RESPIRATION RATE: 16 BRPM | WEIGHT: 149.91 LBS | BODY MASS INDEX: 28.3 KG/M2

## 2019-03-11 DIAGNOSIS — M79.645 PAIN OF FINGER OF LEFT HAND: Primary | ICD-10-CM

## 2019-03-11 DIAGNOSIS — S63.259A FINGER DISLOCATION, INITIAL ENCOUNTER: ICD-10-CM

## 2019-03-11 PROCEDURE — 3017F COLORECTAL CA SCREEN DOC REV: CPT | Performed by: ORTHOPAEDIC SURGERY

## 2019-03-11 PROCEDURE — G8427 DOCREV CUR MEDS BY ELIG CLIN: HCPCS | Performed by: ORTHOPAEDIC SURGERY

## 2019-03-11 PROCEDURE — 4004F PT TOBACCO SCREEN RCVD TLK: CPT | Performed by: ORTHOPAEDIC SURGERY

## 2019-03-11 PROCEDURE — G8482 FLU IMMUNIZE ORDER/ADMIN: HCPCS | Performed by: ORTHOPAEDIC SURGERY

## 2019-03-11 PROCEDURE — L3809 WHFO W/O JOINTS PRE OTS: HCPCS | Performed by: ORTHOPAEDIC SURGERY

## 2019-03-11 PROCEDURE — 99203 OFFICE O/P NEW LOW 30 MIN: CPT | Performed by: ORTHOPAEDIC SURGERY

## 2019-03-11 PROCEDURE — 4040F PNEUMOC VAC/ADMIN/RCVD: CPT | Performed by: ORTHOPAEDIC SURGERY

## 2019-03-11 PROCEDURE — 1090F PRES/ABSN URINE INCON ASSESS: CPT | Performed by: ORTHOPAEDIC SURGERY

## 2019-03-11 PROCEDURE — G8399 PT W/DXA RESULTS DOCUMENT: HCPCS | Performed by: ORTHOPAEDIC SURGERY

## 2019-03-11 PROCEDURE — G8419 CALC BMI OUT NRM PARAM NOF/U: HCPCS | Performed by: ORTHOPAEDIC SURGERY

## 2019-03-11 PROCEDURE — 1123F ACP DISCUSS/DSCN MKR DOCD: CPT | Performed by: ORTHOPAEDIC SURGERY

## 2019-03-11 PROCEDURE — 1101F PT FALLS ASSESS-DOCD LE1/YR: CPT | Performed by: ORTHOPAEDIC SURGERY

## 2019-03-13 DIAGNOSIS — G30.1 LATE ONSET ALZHEIMER'S DISEASE WITH BEHAVIORAL DISTURBANCE (HCC): ICD-10-CM

## 2019-03-13 DIAGNOSIS — F02.818 LATE ONSET ALZHEIMER'S DISEASE WITH BEHAVIORAL DISTURBANCE (HCC): ICD-10-CM

## 2019-03-13 DIAGNOSIS — E78.5 HYPERLIPIDEMIA, UNSPECIFIED HYPERLIPIDEMIA TYPE: ICD-10-CM

## 2019-03-13 DIAGNOSIS — F34.1 DYSTHYMIA: ICD-10-CM

## 2019-03-13 DIAGNOSIS — I10 ESSENTIAL HYPERTENSION: ICD-10-CM

## 2019-03-14 RX ORDER — CITALOPRAM 20 MG/1
TABLET ORAL
Qty: 90 TABLET | Refills: 1 | Status: SHIPPED | OUTPATIENT
Start: 2019-03-14 | End: 2019-08-16 | Stop reason: SDUPTHER

## 2019-03-14 RX ORDER — SIMVASTATIN 40 MG
TABLET ORAL
Qty: 90 TABLET | Refills: 1 | Status: SHIPPED | OUTPATIENT
Start: 2019-03-14 | End: 2019-04-16

## 2019-03-14 RX ORDER — LISINOPRIL 20 MG/1
20 TABLET ORAL DAILY
Qty: 90 TABLET | Refills: 1 | Status: SHIPPED | OUTPATIENT
Start: 2019-03-14 | End: 2019-08-16 | Stop reason: SDUPTHER

## 2019-03-14 RX ORDER — MEMANTINE HYDROCHLORIDE 7 MG/1
CAPSULE, EXTENDED RELEASE ORAL
Qty: 90 CAPSULE | Refills: 1 | Status: SHIPPED | OUTPATIENT
Start: 2019-03-14 | End: 2019-04-30 | Stop reason: DRUGHIGH

## 2019-03-14 RX ORDER — AMLODIPINE BESYLATE 10 MG/1
10 TABLET ORAL DAILY
Qty: 90 TABLET | Refills: 1 | Status: SHIPPED | OUTPATIENT
Start: 2019-03-14 | End: 2019-08-16 | Stop reason: SDUPTHER

## 2019-03-14 RX ORDER — METOPROLOL TARTRATE 100 MG/1
TABLET ORAL
Qty: 90 TABLET | Refills: 1 | Status: SHIPPED | OUTPATIENT
Start: 2019-03-14 | End: 2019-08-16 | Stop reason: SDUPTHER

## 2019-03-27 ENCOUNTER — APPOINTMENT (OUTPATIENT)
Dept: CT IMAGING | Age: 74
End: 2019-03-27
Payer: MEDICARE

## 2019-03-27 ENCOUNTER — HOSPITAL ENCOUNTER (EMERGENCY)
Age: 74
Discharge: HOME | End: 2019-03-27
Attending: EMERGENCY MEDICINE
Payer: MEDICARE

## 2019-03-27 VITALS
WEIGHT: 150 LBS | RESPIRATION RATE: 14 BRPM | OXYGEN SATURATION: 100 % | SYSTOLIC BLOOD PRESSURE: 122 MMHG | HEART RATE: 78 BPM | TEMPERATURE: 98 F | DIASTOLIC BLOOD PRESSURE: 74 MMHG | BODY MASS INDEX: 28.34 KG/M2

## 2019-03-27 DIAGNOSIS — R31.29 MICROSCOPIC HEMATURIA: ICD-10-CM

## 2019-03-27 DIAGNOSIS — N88.8 ENLARGED CERVIX: ICD-10-CM

## 2019-03-27 DIAGNOSIS — R10.32 INTERMITTENT LEFT LOWER QUADRANT ABDOMINAL PAIN: Primary | ICD-10-CM

## 2019-03-27 DIAGNOSIS — D64.9 ANEMIA, UNSPECIFIED TYPE: ICD-10-CM

## 2019-03-27 LAB
A/G RATIO: 1 (ref 1.1–2.2)
ALBUMIN SERPL-MCNC: 3.7 G/DL (ref 3.4–5)
ALP BLD-CCNC: 65 U/L (ref 40–129)
ALT SERPL-CCNC: 13 U/L (ref 10–40)
ANION GAP SERPL CALCULATED.3IONS-SCNC: 11 MMOL/L (ref 3–16)
AST SERPL-CCNC: 15 U/L (ref 15–37)
BASOPHILS ABSOLUTE: 0.1 K/UL (ref 0–0.2)
BASOPHILS RELATIVE PERCENT: 0.9 %
BILIRUB SERPL-MCNC: 0.3 MG/DL (ref 0–1)
BILIRUBIN URINE: ABNORMAL
BLOOD, URINE: ABNORMAL
BUN BLDV-MCNC: 31 MG/DL (ref 7–20)
CALCIUM SERPL-MCNC: 9.4 MG/DL (ref 8.3–10.6)
CHLORIDE BLD-SCNC: 100 MMOL/L (ref 99–110)
CLARITY: CLEAR
CO2: 25 MMOL/L (ref 21–32)
COLOR: YELLOW
CREAT SERPL-MCNC: 1 MG/DL (ref 0.6–1.2)
EKG ATRIAL RATE: 61 BPM
EKG DIAGNOSIS: NORMAL
EKG P AXIS: 71 DEGREES
EKG P-R INTERVAL: 190 MS
EKG Q-T INTERVAL: 426 MS
EKG QRS DURATION: 78 MS
EKG QTC CALCULATION (BAZETT): 428 MS
EKG R AXIS: -13 DEGREES
EKG T AXIS: 43 DEGREES
EKG VENTRICULAR RATE: 61 BPM
EOSINOPHILS ABSOLUTE: 0.3 K/UL (ref 0–0.6)
EOSINOPHILS RELATIVE PERCENT: 3.6 %
EPITHELIAL CELLS, UA: ABNORMAL /HPF
GFR AFRICAN AMERICAN: >60
GFR NON-AFRICAN AMERICAN: 54
GLOBULIN: 3.8 G/DL
GLUCOSE BLD-MCNC: 133 MG/DL (ref 70–99)
GLUCOSE URINE: NEGATIVE MG/DL
HCT VFR BLD CALC: 32.1 % (ref 36–48)
HEMOGLOBIN: 10.8 G/DL (ref 12–16)
KETONES, URINE: NEGATIVE MG/DL
LACTIC ACID: 0.9 MMOL/L (ref 0.4–2)
LEUKOCYTE ESTERASE, URINE: NEGATIVE
LIPASE: 74 U/L (ref 13–60)
LYMPHOCYTES ABSOLUTE: 1.8 K/UL (ref 1–5.1)
LYMPHOCYTES RELATIVE PERCENT: 22.2 %
MCH RBC QN AUTO: 29.9 PG (ref 26–34)
MCHC RBC AUTO-ENTMCNC: 33.6 G/DL (ref 31–36)
MCV RBC AUTO: 89.2 FL (ref 80–100)
MICROSCOPIC EXAMINATION: YES
MONOCYTES ABSOLUTE: 0.6 K/UL (ref 0–1.3)
MONOCYTES RELATIVE PERCENT: 7.7 %
NEUTROPHILS ABSOLUTE: 5.4 K/UL (ref 1.7–7.7)
NEUTROPHILS RELATIVE PERCENT: 65.6 %
NITRITE, URINE: NEGATIVE
PDW BLD-RTO: 14 % (ref 12.4–15.4)
PH UA: 6 (ref 5–8)
PLATELET # BLD: 406 K/UL (ref 135–450)
PMV BLD AUTO: 7.5 FL (ref 5–10.5)
POTASSIUM SERPL-SCNC: 4.5 MMOL/L (ref 3.5–5.1)
PROTEIN UA: NEGATIVE MG/DL
RBC # BLD: 3.6 M/UL (ref 4–5.2)
RBC UA: ABNORMAL /HPF (ref 0–2)
SODIUM BLD-SCNC: 136 MMOL/L (ref 136–145)
SPECIFIC GRAVITY UA: 1.02 (ref 1–1.03)
TOTAL PROTEIN: 7.5 G/DL (ref 6.4–8.2)
TROPONIN: <0.01 NG/ML
URINE TYPE: ABNORMAL
UROBILINOGEN, URINE: 0.2 E.U./DL
WBC # BLD: 8.2 K/UL (ref 4–11)
WBC UA: ABNORMAL /HPF (ref 0–5)

## 2019-03-27 PROCEDURE — 84484 ASSAY OF TROPONIN QUANT: CPT

## 2019-03-27 PROCEDURE — 74177 CT ABD & PELVIS W/CONTRAST: CPT

## 2019-03-27 PROCEDURE — 83605 ASSAY OF LACTIC ACID: CPT

## 2019-03-27 PROCEDURE — 83690 ASSAY OF LIPASE: CPT

## 2019-03-27 PROCEDURE — 93010 ELECTROCARDIOGRAM REPORT: CPT | Performed by: INTERNAL MEDICINE

## 2019-03-27 PROCEDURE — 85025 COMPLETE CBC W/AUTO DIFF WBC: CPT

## 2019-03-27 PROCEDURE — 81001 URINALYSIS AUTO W/SCOPE: CPT

## 2019-03-27 PROCEDURE — 93005 ELECTROCARDIOGRAM TRACING: CPT | Performed by: EMERGENCY MEDICINE

## 2019-03-27 PROCEDURE — 99284 EMERGENCY DEPT VISIT MOD MDM: CPT

## 2019-03-27 PROCEDURE — 6360000004 HC RX CONTRAST MEDICATION: Performed by: EMERGENCY MEDICINE

## 2019-03-27 PROCEDURE — 80053 COMPREHEN METABOLIC PANEL: CPT

## 2019-03-27 RX ADMIN — IOPAMIDOL 75 ML: 755 INJECTION, SOLUTION INTRAVENOUS at 08:53

## 2019-03-27 ASSESSMENT — ENCOUNTER SYMPTOMS
COLOR CHANGE: 0
SHORTNESS OF BREATH: 0
ABDOMINAL PAIN: 1
BACK PAIN: 0
DIARRHEA: 0
NAUSEA: 1
VOMITING: 0

## 2019-03-29 ENCOUNTER — OFFICE VISIT (OUTPATIENT)
Dept: FAMILY MEDICINE CLINIC | Age: 74
End: 2019-03-29
Payer: MEDICARE

## 2019-03-29 VITALS
OXYGEN SATURATION: 98 % | HEART RATE: 64 BPM | DIASTOLIC BLOOD PRESSURE: 70 MMHG | RESPIRATION RATE: 16 BRPM | WEIGHT: 154 LBS | SYSTOLIC BLOOD PRESSURE: 124 MMHG | TEMPERATURE: 97.7 F | BODY MASS INDEX: 29.1 KG/M2

## 2019-03-29 DIAGNOSIS — D25.9 UTERINE LEIOMYOMA, UNSPECIFIED LOCATION: ICD-10-CM

## 2019-03-29 DIAGNOSIS — R10.32 LLQ ABDOMINAL PAIN: ICD-10-CM

## 2019-03-29 DIAGNOSIS — R82.90 FOUL SMELLING URINE: ICD-10-CM

## 2019-03-29 DIAGNOSIS — D64.9 ANEMIA, UNSPECIFIED TYPE: ICD-10-CM

## 2019-03-29 PROCEDURE — 99214 OFFICE O/P EST MOD 30 MIN: CPT | Performed by: NURSE PRACTITIONER

## 2019-03-29 PROCEDURE — G8427 DOCREV CUR MEDS BY ELIG CLIN: HCPCS | Performed by: NURSE PRACTITIONER

## 2019-03-29 PROCEDURE — 4040F PNEUMOC VAC/ADMIN/RCVD: CPT | Performed by: NURSE PRACTITIONER

## 2019-03-29 PROCEDURE — G8482 FLU IMMUNIZE ORDER/ADMIN: HCPCS | Performed by: NURSE PRACTITIONER

## 2019-03-29 PROCEDURE — 4004F PT TOBACCO SCREEN RCVD TLK: CPT | Performed by: NURSE PRACTITIONER

## 2019-03-29 PROCEDURE — 1123F ACP DISCUSS/DSCN MKR DOCD: CPT | Performed by: NURSE PRACTITIONER

## 2019-03-29 PROCEDURE — 1090F PRES/ABSN URINE INCON ASSESS: CPT | Performed by: NURSE PRACTITIONER

## 2019-03-29 PROCEDURE — G8399 PT W/DXA RESULTS DOCUMENT: HCPCS | Performed by: NURSE PRACTITIONER

## 2019-03-29 PROCEDURE — G8419 CALC BMI OUT NRM PARAM NOF/U: HCPCS | Performed by: NURSE PRACTITIONER

## 2019-03-29 PROCEDURE — 3017F COLORECTAL CA SCREEN DOC REV: CPT | Performed by: NURSE PRACTITIONER

## 2019-03-29 RX ORDER — CIPROFLOXACIN 250 MG/1
250 TABLET, FILM COATED ORAL 2 TIMES DAILY
Qty: 10 TABLET | Refills: 0 | Status: SHIPPED | OUTPATIENT
Start: 2019-03-29 | End: 2019-04-01

## 2019-03-29 NOTE — PATIENT INSTRUCTIONS
1. Foul smelling urine - urine to lab, suspect UTI, Cipro antibiotic sent   2. LLQ abdominal pain - ultrasound pelvis   3. Uterine leiomyoma, unspecified location - ultrasound pelvis   4.  Anemia, unspecified type - Stool FIT

## 2019-03-29 NOTE — PROGRESS NOTES
Respiratory: Negative. Cardiovascular: Negative. Gastrointestinal: Positive for abdominal pain. Negative for blood in stool, constipation, diarrhea, nausea and vomiting. Genitourinary: Negative for dysuria, hematuria and vaginal bleeding.        +foul smelling urine   Psychiatric/Behavioral: Negative for agitation. Physical Exam   Constitutional: She appears well-developed and well-nourished. No distress. Eyes: No scleral icterus. Neck: Neck supple. No JVD present. No thyromegaly present. Cardiovascular: Normal rate, regular rhythm and normal heart sounds. Pulmonary/Chest: Effort normal and breath sounds normal.   Abdominal: Soft. Bowel sounds are normal. She exhibits no distension. There is no tenderness. There is no CVA tenderness. Genitourinary: Vagina normal. Cervix exhibits no motion tenderness, no discharge and no friability. Right adnexum displays no mass. Left adnexum displays no mass. Lymphadenopathy:     She has no cervical adenopathy. Neurological: She is alert. Psychiatric: Cognition and memory are impaired. Nursing note and vitals reviewed. Vitals:    03/29/19 1345   BP: 124/70   Pulse: 64   Resp: 16   Temp: 97.7 °F (36.5 °C)   SpO2: 98%       Assessment    1. Foul smelling urine    2. LLQ abdominal pain    3. Uterine leiomyoma, unspecified location    4. Anemia, unspecified type        Plan    Mk Days was seen today for abdominal pain. Diagnoses and all orders for this visit:    Foul smelling urine- suspect UTI, Rx for Cipro  -     URINE CULTURE; Future  -     POCT Urinalysis no Micro; Future        -     ciprofloxacin (CIPRO) 250 MG tablet; Take 1 tablet by mouth 2 times daily for 5 days    LLQ abdominal pain  -     US PELVIS COMPLETE; Future  -     URINE CULTURE; Future  -     POCT Urinalysis no Micro;  Future    Uterine leiomyoma, unspecified location  -     US PELVIS COMPLETE; Future    Anemia, unspecified type  -     POCT Fecal Immunochemical Test (FIT); Future      Return to office for worsening symptoms.   To emergency room for severe symptoms

## 2019-03-30 ENCOUNTER — HOSPITAL ENCOUNTER (OUTPATIENT)
Age: 74
Setting detail: SPECIMEN
Discharge: HOME | End: 2019-03-30
Payer: MEDICARE

## 2019-03-30 DIAGNOSIS — R82.90 FOUL SMELLING URINE: ICD-10-CM

## 2019-03-30 DIAGNOSIS — R10.32 LLQ ABDOMINAL PAIN: ICD-10-CM

## 2019-03-30 LAB
BACTERIA: ABNORMAL /HPF
BILIRUBIN URINE: ABNORMAL
BLOOD, URINE: ABNORMAL
CLARITY: CLEAR
COLOR: YELLOW
EPITHELIAL CELLS, UA: ABNORMAL /HPF
GLUCOSE URINE: NEGATIVE MG/DL
KETONES, URINE: NEGATIVE MG/DL
LEUKOCYTE ESTERASE, URINE: ABNORMAL
MICROSCOPIC EXAMINATION: YES
NITRITE, URINE: NEGATIVE
PH UA: 6 (ref 5–8)
PROTEIN UA: NEGATIVE MG/DL
RBC UA: ABNORMAL /HPF (ref 0–2)
RENAL EPITHELIAL, UA: ABNORMAL /HPF
SPECIFIC GRAVITY UA: 1.01 (ref 1–1.03)
URINE TYPE: ABNORMAL
UROBILINOGEN, URINE: 0.2 E.U./DL
WBC UA: ABNORMAL /HPF (ref 0–5)

## 2019-03-30 PROCEDURE — 87077 CULTURE AEROBIC IDENTIFY: CPT

## 2019-03-30 PROCEDURE — 81001 URINALYSIS AUTO W/SCOPE: CPT

## 2019-03-30 PROCEDURE — 87186 SC STD MICRODIL/AGAR DIL: CPT

## 2019-03-30 PROCEDURE — 87086 URINE CULTURE/COLONY COUNT: CPT

## 2019-04-01 LAB
ORGANISM: ABNORMAL
URINE CULTURE, ROUTINE: ABNORMAL
URINE CULTURE, ROUTINE: ABNORMAL

## 2019-04-01 RX ORDER — SULFAMETHOXAZOLE AND TRIMETHOPRIM 800; 160 MG/1; MG/1
1 TABLET ORAL 2 TIMES DAILY
Qty: 6 TABLET | Refills: 0 | Status: SHIPPED | OUTPATIENT
Start: 2019-04-01 | End: 2019-04-04

## 2019-04-01 RX ORDER — SULFAMETHOXAZOLE AND TRIMETHOPRIM 800; 160 MG/1; MG/1
1 TABLET ORAL 2 TIMES DAILY
Qty: 10 TABLET | Refills: 0 | Status: ON HOLD | OUTPATIENT
Start: 2019-04-01 | End: 2019-04-08 | Stop reason: HOSPADM

## 2019-04-05 ENCOUNTER — HOSPITAL ENCOUNTER (OUTPATIENT)
Dept: ULTRASOUND IMAGING | Age: 74
Discharge: HOME | DRG: 682 | End: 2019-04-05
Payer: MEDICARE

## 2019-04-05 ENCOUNTER — OFFICE VISIT (OUTPATIENT)
Dept: FAMILY MEDICINE CLINIC | Age: 74
End: 2019-04-05
Payer: MEDICARE

## 2019-04-05 VITALS
HEART RATE: 60 BPM | RESPIRATION RATE: 20 BRPM | DIASTOLIC BLOOD PRESSURE: 80 MMHG | BODY MASS INDEX: 29.48 KG/M2 | OXYGEN SATURATION: 98 % | TEMPERATURE: 97.8 F | SYSTOLIC BLOOD PRESSURE: 124 MMHG | WEIGHT: 156 LBS

## 2019-04-05 DIAGNOSIS — R10.32 LLQ ABDOMINAL PAIN: ICD-10-CM

## 2019-04-05 DIAGNOSIS — N17.9 AKI (ACUTE KIDNEY INJURY) (HCC): ICD-10-CM

## 2019-04-05 DIAGNOSIS — R53.1 WEAKNESS: ICD-10-CM

## 2019-04-05 DIAGNOSIS — R93.89 ENDOMETRIAL THICKENING ON ULTRASOUND: Primary | ICD-10-CM

## 2019-04-05 DIAGNOSIS — D25.9 UTERINE LEIOMYOMA, UNSPECIFIED LOCATION: ICD-10-CM

## 2019-04-05 DIAGNOSIS — E87.1 HYPONATREMIA: ICD-10-CM

## 2019-04-05 DIAGNOSIS — R06.02 SHORTNESS OF BREATH: Primary | ICD-10-CM

## 2019-04-05 DIAGNOSIS — R10.32 ABDOMINAL PAIN, LEFT LOWER QUADRANT: ICD-10-CM

## 2019-04-05 LAB
A/G RATIO: 1.3 (ref 1.1–2.2)
ALBUMIN SERPL-MCNC: 4.2 G/DL (ref 3.4–5)
ALP BLD-CCNC: 77 U/L (ref 40–129)
ALT SERPL-CCNC: 14 U/L (ref 10–40)
ANION GAP SERPL CALCULATED.3IONS-SCNC: 15 MMOL/L (ref 3–16)
AST SERPL-CCNC: 19 U/L (ref 15–37)
BASOPHILS ABSOLUTE: 0.1 K/UL (ref 0–0.2)
BASOPHILS RELATIVE PERCENT: 0.6 %
BILIRUB SERPL-MCNC: 0.4 MG/DL (ref 0–1)
BUN BLDV-MCNC: 36 MG/DL (ref 7–20)
CALCIUM SERPL-MCNC: 9.3 MG/DL (ref 8.3–10.6)
CHLORIDE BLD-SCNC: 92 MMOL/L (ref 99–110)
CO2: 22 MMOL/L (ref 21–32)
CREAT SERPL-MCNC: 1.6 MG/DL (ref 0.6–1.2)
EOSINOPHILS ABSOLUTE: 0.4 K/UL (ref 0–0.6)
EOSINOPHILS RELATIVE PERCENT: 3.8 %
GFR AFRICAN AMERICAN: 38
GFR NON-AFRICAN AMERICAN: 32
GLOBULIN: 3.3 G/DL
GLUCOSE BLD-MCNC: 101 MG/DL (ref 70–99)
HCT VFR BLD CALC: 32.6 % (ref 36–48)
HEMOGLOBIN: 11 G/DL (ref 12–16)
LYMPHOCYTES ABSOLUTE: 2.3 K/UL (ref 1–5.1)
LYMPHOCYTES RELATIVE PERCENT: 21.3 %
MCH RBC QN AUTO: 30.1 PG (ref 26–34)
MCHC RBC AUTO-ENTMCNC: 33.8 G/DL (ref 31–36)
MCV RBC AUTO: 89 FL (ref 80–100)
MONOCYTES ABSOLUTE: 0.9 K/UL (ref 0–1.3)
MONOCYTES RELATIVE PERCENT: 8.1 %
NEUTROPHILS ABSOLUTE: 7 K/UL (ref 1.7–7.7)
NEUTROPHILS RELATIVE PERCENT: 66.2 %
PDW BLD-RTO: 13.9 % (ref 12.4–15.4)
PLATELET # BLD: 526 K/UL (ref 135–450)
PMV BLD AUTO: 8.2 FL (ref 5–10.5)
POTASSIUM SERPL-SCNC: 5.4 MMOL/L (ref 3.5–5.1)
RBC # BLD: 3.66 M/UL (ref 4–5.2)
SODIUM BLD-SCNC: 129 MMOL/L (ref 136–145)
TOTAL PROTEIN: 7.5 G/DL (ref 6.4–8.2)
TROPONIN: <0.01 NG/ML
TSH REFLEX: 2.03 UIU/ML (ref 0.27–4.2)
WBC # BLD: 10.6 K/UL (ref 4–11)

## 2019-04-05 PROCEDURE — 76830 TRANSVAGINAL US NON-OB: CPT

## 2019-04-05 PROCEDURE — 99214 OFFICE O/P EST MOD 30 MIN: CPT | Performed by: FAMILY MEDICINE

## 2019-04-05 PROCEDURE — 3017F COLORECTAL CA SCREEN DOC REV: CPT | Performed by: FAMILY MEDICINE

## 2019-04-05 PROCEDURE — 1123F ACP DISCUSS/DSCN MKR DOCD: CPT | Performed by: FAMILY MEDICINE

## 2019-04-05 PROCEDURE — G8419 CALC BMI OUT NRM PARAM NOF/U: HCPCS | Performed by: FAMILY MEDICINE

## 2019-04-05 PROCEDURE — 4004F PT TOBACCO SCREEN RCVD TLK: CPT | Performed by: FAMILY MEDICINE

## 2019-04-05 PROCEDURE — G8427 DOCREV CUR MEDS BY ELIG CLIN: HCPCS | Performed by: FAMILY MEDICINE

## 2019-04-05 PROCEDURE — 76856 US EXAM PELVIC COMPLETE: CPT

## 2019-04-05 PROCEDURE — G8399 PT W/DXA RESULTS DOCUMENT: HCPCS | Performed by: FAMILY MEDICINE

## 2019-04-05 PROCEDURE — 93000 ELECTROCARDIOGRAM COMPLETE: CPT | Performed by: FAMILY MEDICINE

## 2019-04-05 PROCEDURE — 4040F PNEUMOC VAC/ADMIN/RCVD: CPT | Performed by: FAMILY MEDICINE

## 2019-04-05 PROCEDURE — 36415 COLL VENOUS BLD VENIPUNCTURE: CPT | Performed by: FAMILY MEDICINE

## 2019-04-05 PROCEDURE — 1090F PRES/ABSN URINE INCON ASSESS: CPT | Performed by: FAMILY MEDICINE

## 2019-04-05 ASSESSMENT — ENCOUNTER SYMPTOMS
WHEEZING: 0
SHORTNESS OF BREATH: 1

## 2019-04-05 NOTE — PROGRESS NOTES
Avi Bella is a 68 y.o. female    Chief Complaint   Patient presents with    Fatigue    Shortness of Breath       HPI:    Shortness of Breath   This is a new problem. The current episode started today. The problem occurs intermittently. The problem has been unchanged. Pertinent negatives include no wheezing. The symptoms are aggravated by any activity. She has tried nothing for the symptoms. There is no history of CAD. Fatigue   This is a new problem. The current episode started in the past 7 days. The problem occurs constantly. The problem has been rapidly worsening. Associated symptoms include fatigue and weakness. Nothing aggravates the symptoms. Treatments tried: bactrim for UTI. The treatment provided mild relief. ROS:    Review of Systems   Constitutional: Positive for fatigue. Respiratory: Positive for shortness of breath. Negative for wheezing. Neurological: Positive for weakness. /80   Pulse 60   Temp 97.8 °F (36.6 °C) (Oral)   Resp 20   Wt 156 lb (70.8 kg)   LMP  (LMP Unknown)   SpO2 98%   BMI 29.48 kg/m²     Physical Exam:    Physical Exam   Constitutional: No distress. HENT:   Head: Normocephalic. Neck: Normal range of motion. Neck supple. Cardiovascular: Normal rate and regular rhythm. No murmur heard. Pulmonary/Chest: Effort normal and breath sounds normal.   Lymphadenopathy:     She has no cervical adenopathy. Neurological: She is alert. Skin: Skin is warm and dry. She is not diaphoretic. Psychiatric: She has a normal mood and affect. Her speech is not rapid and/or pressured. Thought content is not paranoid. Cognition and memory are impaired. She is noncommunicative. She is inattentive.        Current Outpatient Medications   Medication Sig Dispense Refill    sulfamethoxazole-trimethoprim (BACTRIM DS) 800-160 MG per tablet Take 1 tablet by mouth 2 times daily for 5 days 10 tablet 0    amLODIPine (NORVASC) 10 MG tablet Take 1 tablet by mouth daily 90 tablet 1    citalopram (CELEXA) 20 MG tablet TAKE 1 TABLET EVERY DAY 90 tablet 1    lisinopril (PRINIVIL;ZESTRIL) 20 MG tablet Take 1 tablet by mouth daily 90 tablet 1    memantine ER (NAMENDA XR) 7 MG CP24 extended release capsule TAKE 1 CAPSULE EVERY DAY 90 capsule 1    metoprolol (LOPRESSOR) 100 MG tablet TAKE 1 TABLET EVERY DAY 90 tablet 1    simvastatin (ZOCOR) 40 MG tablet TAKE 1 TABLET EVERY NIGHT 90 tablet 1    etodolac (LODINE) 400 MG tablet Take 1 tablet by mouth daily As needed for pain 90 tablet 1    Calcium Carb-Cholecalciferol (CALCIUM 1000 + D PO) Take by mouth      Multiple Vitamins-Minerals (THERAPEUTIC MULTIVITAMIN-MINERALS) tablet Take 1 tablet by mouth daily.  aspirin 81 MG tablet Take 81 mg by mouth daily. No current facility-administered medications for this visit. Assessment:    1. Shortness of breath    2. Weakness    3. Hyponatremia    4. TAYLOR (acute kidney injury) ([facility] [address]        Plan:    1. Shortness of breath  EKG was sinus bradycardia so unlikely cardiac related. - EKG 12 Lead    2. Weakness  Very abnormal labs listed below. Low sodium, hyperkalemia and TAYLOR. I called the patient to discuss going to the ER for further evaluation. Possibly from recent UTI which has not resolved. Patient is very confused and weak and outpatient management is not recommended at this time. - CBC Auto Differential  - Comprehensive Metabolic Panel  - TSH with Reflex  - Troponin  - POCT Urinalysis no Micro; Future    3. Hyponatremia  As above, needs to go to ER. 4. TAYLOR (acute kidney injury) ([facility] [address]  As above, needs to go to ER. Patient to return to clinic if symptoms worsen or fail to improve.

## 2019-04-06 ENCOUNTER — HOSPITAL ENCOUNTER (INPATIENT)
Age: 74
LOS: 2 days | Discharge: NURSING, REHAB OR LONG-TERM CARE | DRG: 682 | End: 2019-04-08
Attending: EMERGENCY MEDICINE | Admitting: FAMILY MEDICINE
Payer: MEDICARE

## 2019-04-06 ENCOUNTER — HOSPITAL ENCOUNTER (OUTPATIENT)
Age: 74
Discharge: HOME | DRG: 682 | End: 2019-04-06
Payer: MEDICARE

## 2019-04-06 DIAGNOSIS — R53.1 WEAKNESS: ICD-10-CM

## 2019-04-06 DIAGNOSIS — Z87.440 HISTORY OF UTI: ICD-10-CM

## 2019-04-06 DIAGNOSIS — R53.1 GENERALIZED WEAKNESS: ICD-10-CM

## 2019-04-06 DIAGNOSIS — D75.839 THROMBOCYTOSIS: ICD-10-CM

## 2019-04-06 DIAGNOSIS — N17.9 AKI (ACUTE KIDNEY INJURY) (HCC): ICD-10-CM

## 2019-04-06 DIAGNOSIS — E87.1 HYPONATREMIA: Primary | ICD-10-CM

## 2019-04-06 PROBLEM — G93.40 ENCEPHALOPATHY: Status: ACTIVE | Noted: 2019-04-06

## 2019-04-06 LAB
A/G RATIO: 1.1 (ref 1.1–2.2)
ALBUMIN SERPL-MCNC: 3.8 G/DL (ref 3.4–5)
ALP BLD-CCNC: 66 U/L (ref 40–129)
ALT SERPL-CCNC: 15 U/L (ref 10–40)
ANION GAP SERPL CALCULATED.3IONS-SCNC: 13 MMOL/L (ref 3–16)
AST SERPL-CCNC: 19 U/L (ref 15–37)
BACTERIA: ABNORMAL /HPF
BACTERIA: ABNORMAL /HPF
BASOPHILS ABSOLUTE: 0.1 K/UL (ref 0–0.2)
BASOPHILS RELATIVE PERCENT: 1.1 %
BILIRUB SERPL-MCNC: 0.3 MG/DL (ref 0–1)
BILIRUBIN URINE: ABNORMAL
BILIRUBIN URINE: ABNORMAL
BLOOD, URINE: ABNORMAL
BLOOD, URINE: NEGATIVE
BUN BLDV-MCNC: 37 MG/DL (ref 7–20)
CALCIUM SERPL-MCNC: 8.8 MG/DL (ref 8.3–10.6)
CHLORIDE BLD-SCNC: 93 MMOL/L (ref 99–110)
CHLORIDE URINE RANDOM: <20 MMOL/L
CLARITY: CLEAR
CLARITY: CLEAR
CO2: 21 MMOL/L (ref 21–32)
COLOR: YELLOW
COLOR: YELLOW
CREAT SERPL-MCNC: 1.4 MG/DL (ref 0.6–1.2)
EKG ATRIAL RATE: 58 BPM
EKG DIAGNOSIS: NORMAL
EKG P AXIS: 35 DEGREES
EKG P-R INTERVAL: 168 MS
EKG Q-T INTERVAL: 420 MS
EKG QRS DURATION: 84 MS
EKG QTC CALCULATION (BAZETT): 412 MS
EKG R AXIS: 17 DEGREES
EKG T AXIS: 57 DEGREES
EKG VENTRICULAR RATE: 58 BPM
EOSINOPHILS ABSOLUTE: 0.5 K/UL (ref 0–0.6)
EOSINOPHILS RELATIVE PERCENT: 5.1 %
EPITHELIAL CELLS, UA: ABNORMAL /HPF
EPITHELIAL CELLS, UA: ABNORMAL /HPF
GFR AFRICAN AMERICAN: 45
GFR NON-AFRICAN AMERICAN: 37
GLOBULIN: 3.5 G/DL
GLUCOSE BLD-MCNC: 132 MG/DL (ref 70–99)
GLUCOSE URINE: NEGATIVE MG/DL
GLUCOSE URINE: NEGATIVE MG/DL
HCT VFR BLD CALC: 30.1 % (ref 36–48)
HEMOGLOBIN: 10.2 G/DL (ref 12–16)
KETONES, URINE: NEGATIVE MG/DL
KETONES, URINE: NEGATIVE MG/DL
LEUKOCYTE ESTERASE, URINE: ABNORMAL
LEUKOCYTE ESTERASE, URINE: ABNORMAL
LYMPHOCYTES ABSOLUTE: 2.1 K/UL (ref 1–5.1)
LYMPHOCYTES RELATIVE PERCENT: 21.9 %
MAGNESIUM: 2 MG/DL (ref 1.8–2.4)
MCH RBC QN AUTO: 29.6 PG (ref 26–34)
MCHC RBC AUTO-ENTMCNC: 33.7 G/DL (ref 31–36)
MCV RBC AUTO: 87.8 FL (ref 80–100)
MICROSCOPIC EXAMINATION: YES
MICROSCOPIC EXAMINATION: YES
MONOCYTES ABSOLUTE: 0.8 K/UL (ref 0–1.3)
MONOCYTES RELATIVE PERCENT: 8.9 %
NEUTROPHILS ABSOLUTE: 6 K/UL (ref 1.7–7.7)
NEUTROPHILS RELATIVE PERCENT: 63 %
NITRITE, URINE: NEGATIVE
NITRITE, URINE: NEGATIVE
PDW BLD-RTO: 14.1 % (ref 12.4–15.4)
PH UA: 6 (ref 5–8)
PH UA: 6 (ref 5–8)
PHOSPHORUS: 3.7 MG/DL (ref 2.5–4.9)
PLATELET # BLD: 465 K/UL (ref 135–450)
PMV BLD AUTO: 7.1 FL (ref 5–10.5)
POTASSIUM SERPL-SCNC: 4.6 MMOL/L (ref 3.5–5.1)
POTASSIUM, UR: 12.9 MMOL/L
PROTEIN UA: NEGATIVE MG/DL
PROTEIN UA: NEGATIVE MG/DL
RBC # BLD: 3.43 M/UL (ref 4–5.2)
RBC UA: ABNORMAL /HPF (ref 0–2)
RBC UA: ABNORMAL /HPF (ref 0–2)
SODIUM BLD-SCNC: 127 MMOL/L (ref 136–145)
SODIUM URINE: <20 MMOL/L
SPECIFIC GRAVITY UA: 1.01 (ref 1–1.03)
SPECIFIC GRAVITY UA: <=1.005 (ref 1–1.03)
TOTAL PROTEIN: 7.3 G/DL (ref 6.4–8.2)
URINE TYPE: ABNORMAL
URINE TYPE: ABNORMAL
UROBILINOGEN, URINE: 0.2 E.U./DL
UROBILINOGEN, URINE: 0.2 E.U./DL
WBC # BLD: 9.5 K/UL (ref 4–11)
WBC UA: ABNORMAL /HPF (ref 0–5)
WBC UA: ABNORMAL /HPF (ref 0–5)

## 2019-04-06 PROCEDURE — 82436 ASSAY OF URINE CHLORIDE: CPT

## 2019-04-06 PROCEDURE — 93010 ELECTROCARDIOGRAM REPORT: CPT | Performed by: INTERNAL MEDICINE

## 2019-04-06 PROCEDURE — 81001 URINALYSIS AUTO W/SCOPE: CPT

## 2019-04-06 PROCEDURE — 93005 ELECTROCARDIOGRAM TRACING: CPT | Performed by: EMERGENCY MEDICINE

## 2019-04-06 PROCEDURE — 2580000003 HC RX 258: Performed by: EMERGENCY MEDICINE

## 2019-04-06 PROCEDURE — 84133 ASSAY OF URINE POTASSIUM: CPT

## 2019-04-06 PROCEDURE — 99284 EMERGENCY DEPT VISIT MOD MDM: CPT

## 2019-04-06 PROCEDURE — 80053 COMPREHEN METABOLIC PANEL: CPT

## 2019-04-06 PROCEDURE — 85025 COMPLETE CBC W/AUTO DIFF WBC: CPT

## 2019-04-06 PROCEDURE — 84100 ASSAY OF PHOSPHORUS: CPT

## 2019-04-06 PROCEDURE — 36415 COLL VENOUS BLD VENIPUNCTURE: CPT

## 2019-04-06 PROCEDURE — 83735 ASSAY OF MAGNESIUM: CPT

## 2019-04-06 PROCEDURE — 84300 ASSAY OF URINE SODIUM: CPT

## 2019-04-06 PROCEDURE — 6370000000 HC RX 637 (ALT 250 FOR IP): Performed by: FAMILY MEDICINE

## 2019-04-06 PROCEDURE — 2580000003 HC RX 258: Performed by: FAMILY MEDICINE

## 2019-04-06 PROCEDURE — 1200000000 HC SEMI PRIVATE

## 2019-04-06 PROCEDURE — 6360000002 HC RX W HCPCS: Performed by: FAMILY MEDICINE

## 2019-04-06 RX ORDER — MEMANTINE HYDROCHLORIDE 5 MG/1
5 TABLET ORAL DAILY
Status: DISCONTINUED | OUTPATIENT
Start: 2019-04-06 | End: 2019-04-08 | Stop reason: HOSPADM

## 2019-04-06 RX ORDER — SODIUM CHLORIDE 9 MG/ML
INJECTION, SOLUTION INTRAVENOUS CONTINUOUS
Status: DISCONTINUED | OUTPATIENT
Start: 2019-04-06 | End: 2019-04-08 | Stop reason: HOSPADM

## 2019-04-06 RX ORDER — AMLODIPINE BESYLATE 5 MG/1
10 TABLET ORAL DAILY
Status: DISCONTINUED | OUTPATIENT
Start: 2019-04-06 | End: 2019-04-08 | Stop reason: HOSPADM

## 2019-04-06 RX ORDER — METOPROLOL TARTRATE 50 MG/1
100 TABLET, FILM COATED ORAL DAILY
Status: DISCONTINUED | OUTPATIENT
Start: 2019-04-06 | End: 2019-04-08 | Stop reason: HOSPADM

## 2019-04-06 RX ORDER — SODIUM CHLORIDE 0.9 % (FLUSH) 0.9 %
10 SYRINGE (ML) INJECTION PRN
Status: DISCONTINUED | OUTPATIENT
Start: 2019-04-06 | End: 2019-04-08 | Stop reason: HOSPADM

## 2019-04-06 RX ORDER — SIMVASTATIN 40 MG
40 TABLET ORAL NIGHTLY
Status: DISCONTINUED | OUTPATIENT
Start: 2019-04-06 | End: 2019-04-08 | Stop reason: HOSPADM

## 2019-04-06 RX ORDER — M-VIT,TX,IRON,MINS/CALC/FOLIC 27MG-0.4MG
1 TABLET ORAL DAILY
Status: DISCONTINUED | OUTPATIENT
Start: 2019-04-06 | End: 2019-04-08 | Stop reason: HOSPADM

## 2019-04-06 RX ORDER — ONDANSETRON 2 MG/ML
4 INJECTION INTRAMUSCULAR; INTRAVENOUS EVERY 6 HOURS PRN
Status: DISCONTINUED | OUTPATIENT
Start: 2019-04-06 | End: 2019-04-08 | Stop reason: HOSPADM

## 2019-04-06 RX ORDER — CITALOPRAM 20 MG/1
20 TABLET ORAL DAILY
Status: DISCONTINUED | OUTPATIENT
Start: 2019-04-06 | End: 2019-04-08 | Stop reason: HOSPADM

## 2019-04-06 RX ORDER — SODIUM CHLORIDE 0.9 % (FLUSH) 0.9 %
10 SYRINGE (ML) INJECTION EVERY 12 HOURS SCHEDULED
Status: DISCONTINUED | OUTPATIENT
Start: 2019-04-06 | End: 2019-04-08 | Stop reason: HOSPADM

## 2019-04-06 RX ORDER — ASPIRIN 81 MG/1
81 TABLET ORAL DAILY
Status: DISCONTINUED | OUTPATIENT
Start: 2019-04-06 | End: 2019-04-08 | Stop reason: HOSPADM

## 2019-04-06 RX ORDER — SODIUM CHLORIDE 9 MG/ML
1000 INJECTION, SOLUTION INTRAVENOUS CONTINUOUS
Status: DISCONTINUED | OUTPATIENT
Start: 2019-04-06 | End: 2019-04-07

## 2019-04-06 RX ADMIN — SODIUM CHLORIDE: 9 INJECTION, SOLUTION INTRAVENOUS at 19:09

## 2019-04-06 RX ADMIN — SIMVASTATIN 40 MG: 40 TABLET, FILM COATED ORAL at 20:18

## 2019-04-06 RX ADMIN — SODIUM CHLORIDE 1000 ML: 9 INJECTION, SOLUTION INTRAVENOUS at 16:31

## 2019-04-06 RX ADMIN — ENOXAPARIN SODIUM 40 MG: 40 INJECTION SUBCUTANEOUS at 19:27

## 2019-04-06 NOTE — ED PROVIDER NOTES
CHIEF COMPLAINT  Abnormal Lab (Pt reports had blood work last week, repeat bloodwork yesterday. Decreasing kidney function noted between the draws. Pt sent to ER for eval by PCP. Pt has been increasingly weak. )    HISTORY OF PRESENT ILLNESS  Kim Newberry is a 68 y.o. female who presents to the ED w/ report of abnormal labs concerning worsening kidney fxn and abnormal Na+ and K+. Had recent UTI on BACTRIM, Finished the abx,.  reports increased  Weakness and confusion, pt has hx of dementia, on Memantine. Also decreased control of BM and bladder fxn recently. No n/v or fevers or chills. No other complaints, modifying factors or associated symptoms. I have reviewed the following from the nursing documentation.     Past Medical History:   Diagnosis Date    Arthritis     Depression     Eczema     Hyperlipidemia     Hypertension     Osteoarthritis      Past Surgical History:   Procedure Laterality Date    TONSILLECTOMY       Family History   Problem Relation Age of Onset    Diabetes Father     Heart Disease Father      Social History     Socioeconomic History    Marital status:      Spouse name: Not on file    Number of children: Not on file    Years of education: Not on file    Highest education level: Not on file   Occupational History    Not on file   Social Needs    Financial resource strain: Not on file    Food insecurity:     Worry: Not on file     Inability: Not on file    Transportation needs:     Medical: Not on file     Non-medical: Not on file   Tobacco Use    Smoking status: Current Some Day Smoker     Packs/day: 0.25     Years: 20.00     Pack years: 5.00     Types: Cigarettes    Smokeless tobacco: Never Used    Tobacco comment: 0.5 or 2 cigs advised to quit   Substance and Sexual Activity    Alcohol use: No     Alcohol/week: 0.0 oz    Drug use: No    Sexual activity: Yes     Partners: Male   Lifestyle    Physical activity:     Days per week: Not on file     Minutes per session: Not on file    Stress: Not on file   Relationships    Social connections:     Talks on phone: Not on file     Gets together: Not on file     Attends Tenriism service: Not on file     Active member of club or organization: Not on file     Attends meetings of clubs or organizations: Not on file     Relationship status: Not on file    Intimate partner violence:     Fear of current or ex partner: Not on file     Emotionally abused: Not on file     Physically abused: Not on file     Forced sexual activity: Not on file   Other Topics Concern    Not on file   Social History Narrative    Not on file     No current facility-administered medications for this encounter. Current Outpatient Medications   Medication Sig Dispense Refill    sulfamethoxazole-trimethoprim (BACTRIM DS) 800-160 MG per tablet Take 1 tablet by mouth 2 times daily for 5 days 10 tablet 0    amLODIPine (NORVASC) 10 MG tablet Take 1 tablet by mouth daily 90 tablet 1    citalopram (CELEXA) 20 MG tablet TAKE 1 TABLET EVERY DAY 90 tablet 1    lisinopril (PRINIVIL;ZESTRIL) 20 MG tablet Take 1 tablet by mouth daily 90 tablet 1    memantine ER (NAMENDA XR) 7 MG CP24 extended release capsule TAKE 1 CAPSULE EVERY DAY 90 capsule 1    metoprolol (LOPRESSOR) 100 MG tablet TAKE 1 TABLET EVERY DAY 90 tablet 1    simvastatin (ZOCOR) 40 MG tablet TAKE 1 TABLET EVERY NIGHT 90 tablet 1    etodolac (LODINE) 400 MG tablet Take 1 tablet by mouth daily As needed for pain 90 tablet 1    Calcium Carb-Cholecalciferol (CALCIUM 1000 + D PO) Take by mouth      Multiple Vitamins-Minerals (THERAPEUTIC MULTIVITAMIN-MINERALS) tablet Take 1 tablet by mouth daily.  aspirin 81 MG tablet Take 81 mg by mouth daily. No Known Allergies    REVIEW OF SYSTEMS  10 systems reviewed, pertinent positives per HPI otherwise noted to be negative.     PHYSICAL EXAM  /75   Pulse 60   Temp 97.8 °F (36.6 °C) (Oral)   Resp 16   Ht 5' 1\" (1.549 m)   Wt 150 lb (68 kg)   LMP  (LMP Unknown)   SpO2 97%   BMI 28.34 kg/m²   GENERAL APPEARANCE: Awake and alert. Cooperative. Fatigued. Non-toxic appearing. HEAD: Normocephalic. Atraumatic. EYES: PERRL. EOM's grossly intact. ENT: Mucous membranes are moist.   NECK: Supple. HEART: bradycardia, hr 57. LUNGS: Respirations unlabored. CTAB. Good air exchange. BACK: No midline spinal tenderness or step-off. ABDOMEN: Soft. Non-distended. Non-tender. No guarding or rebound. Normal bowel sounds. EXTREMITIES: No peripheral edema. Moves all extremities equally. All extremities neurovascularly intact. SKIN: Warm and dry. No acute rashes. NEUROLOGICAL: Alert and oriented x1-2 (baseline). No gross facial drooping. LABS  I have reviewed all labs for this visit. Results for orders placed or performed during the hospital encounter of 04/06/19   CBC Auto Differential   Result Value Ref Range    WBC 9.5 4.0 - 11.0 K/uL    RBC 3.43 (L) 4.00 - 5.20 M/uL    Hemoglobin 10.2 (L) 12.0 - 16.0 g/dL    Hematocrit 30.1 (L) 36.0 - 48.0 %    MCV 87.8 80.0 - 100.0 fL    MCH 29.6 26.0 - 34.0 pg    MCHC 33.7 31.0 - 36.0 g/dL    RDW 14.1 12.4 - 15.4 %    Platelets 959 (H) 689 - 450 K/uL    MPV 7.1 5.0 - 10.5 fL    Neutrophils % 63.0 %    Lymphocytes % 21.9 %    Monocytes % 8.9 %    Eosinophils % 5.1 %    Basophils % 1.1 %    Neutrophils # 6.0 1.7 - 7.7 K/uL    Lymphocytes # 2.1 1.0 - 5.1 K/uL    Monocytes # 0.8 0.0 - 1.3 K/uL    Eosinophils # 0.5 0.0 - 0.6 K/uL    Basophils # 0.1 0.0 - 0.2 K/uL   EKG 12 Lead   Result Value Ref Range    Ventricular Rate 58 BPM    Atrial Rate 58 BPM    P-R Interval 168 ms    QRS Duration 84 ms    Q-T Interval 420 ms    QTc Calculation (Bazett) 412 ms    P Axis 35 degrees    R Axis 17 degrees    T Axis 57 degrees    Diagnosis       Sinus bradycardiaSeptal infarct , age undeterminedAbnormal ECGWhen compared with ECG of 27-MAR-2019 07:39,No significant change was found     EKG  EKG interpreted by me.   Sinus bradycardia, HR 58, normal intervals, QRS 84, no significant T wave changes, No significant ST elevation or depression. ED COURSE/MDM  Patient seen and evaluated. Pt increased weakness and confusion, recent uti. Worse kidney fxn may be related to recent uti as well as may be related to recent bactrim use. Her labs show worsened Na+ although Cr is slightly improved. K+ normal. UA shows no concern for uti. She also has increased AMS per  may be 2/2 low Na+ or slowly worsening dementia but  states seems more recent. Will admit for the low Na+ and taylor and worse confusion/weakness. CLINICAL IMPRESSION  1. TAYLOR (acute kidney injury) ([address]    2. History of UTI    3. Thrombocytosis (HCC)        Blood pressure 113/75, pulse 60, temperature 97.8 °F (36.6 °C), temperature source Oral, resp. rate 16, height 5' 1\" (1.549 m), weight 150 lb (68 kg), SpO2 97 %, not currently breastfeeding. DISPOSITION  Liborio Ross was admitted in stable condition. This chart was generated in part by using Dragon Dictation system and may contain errors related to that system including errors in grammar, punctuation, and spelling, as well as words and phrases that may be inappropriate. When dictating, effort is made to correct spelling/grammar errors. If there are any questions or concerns please feel free to contact the dictating provider for clarification.      Jeremie Chandler DO  EMERGENCY MEDICINE        Savannah Apley, DO  04/06/19 3937

## 2019-04-06 NOTE — H&P
Calcium Carb-Cholecalciferol (CALCIUM 1000 + D PO) Take by mouth    Historical Provider, MD   Multiple Vitamins-Minerals (THERAPEUTIC MULTIVITAMIN-MINERALS) tablet Take 1 tablet by mouth daily. Historical Provider, MD   aspirin 81 MG tablet Take 81 mg by mouth daily. Historical Provider, MD       Allergies:  Patient has no known allergies. Social History:      The patient currently lives with family     TOBACCO:   reports that she has been smoking cigarettes. She has a 5.00 pack-year smoking history. She has never used smokeless tobacco.  ETOH:   reports that she does not drink alcohol. Family History:       Reviewed in detail and negative for  Cancer, CVA. Positive as follows:        Problem Relation Age of Onset    Diabetes Father     Heart Disease Father        REVIEW OF SYSTEMS:   Pertinent positives as noted in the HPI. All other systems reviewed and negative. PHYSICAL EXAM PERFORMED:    /68   Pulse 65   Temp 97.8 °F (36.6 °C) (Oral)   Resp 16   Ht 5' 1\" (1.549 m)   Wt 150 lb (68 kg)   LMP  (LMP Unknown)   SpO2 98%   BMI 28.34 kg/m²     General appearance:  No apparent distress, appears stated age and cooperative. HEENT:  Normal cephalic, atraumatic without obvious deformity. Pupils equal, round, and reactive to light. Extra ocular muscles intact. Conjunctivae/corneas clear. Neck: Supple, with full range of motion. No jugular venous distention. Trachea midline. Respiratory:  Normal respiratory effort. Clear to auscultation, bilaterally without Rales/Wheezes/Rhonchi. Cardiovascular:  Regular rate and rhythm with normal S1/S2 without murmurs, rubs or gallops. Abdomen: Soft, non-tender, non-distended with normal bowel sounds. Musculoskeletal:  No clubbing, cyanosis or edema bilaterally. Full range of motion without deformity. Skin: Skin color, texture, turgor normal.  No rashes or lesions. Neurologic:  Neurovascularly intact without any focal sensory/motor deficits. Cranial nerves: II-XII intact, grossly non-focal.  Psychiatric:  Alert and oriented, thought content short term memory loss   Capillary Refill: Brisk,< 3 seconds   Peripheral Pulses: +2 palpable, equal bilaterally       Labs:     Recent Labs     04/05/19  1622 04/06/19  1516   WBC 10.6 9.5   HGB 11.0* 10.2*   HCT 32.6* 30.1*   * 465*     Recent Labs     04/05/19  1622 04/06/19  1516   * 127*   K 5.4* 4.6   CL 92* 93*   CO2 22 21   BUN 36* 37*   CREATININE 1.6* 1.4*   CALCIUM 9.3 8.8   PHOS  --  3.7     Recent Labs     04/05/19  1622 04/06/19  1516   AST 19 19   ALT 14 15   BILITOT 0.4 0.3   ALKPHOS 77 66     No results for input(s): INR in the last 72 hours. Recent Labs     04/05/19  1622   TROPONINI <0.01       Urinalysis:      Lab Results   Component Value Date    NITRU Negative 04/06/2019    WBCUA 6-10 04/06/2019    BACTERIA Rare 04/06/2019    RBCUA 3-5 04/06/2019    BLOODU Negative 04/06/2019    SPECGRAV <=1.005 04/06/2019    GLUCOSEU Negative 04/06/2019       Radiology:     CXR: I have reviewed the CXR with the following interpretation:   EKG:  I have reviewed the EKG with the following interpretation:     No orders to display       ASSESSMENT:    Active Hospital Problems    Diagnosis Date Noted    Encephalopathy [G93.40] 04/06/2019         PLAN:    Metabolic encephalopathy   ARF   Hyponatremia     Pt with dementia, rx for UTI recently with bactrim , presents with inc weakness, poor po intake, ?  Diarrhea   pts creat incr from a baseline of 1 to 1.6-1.4 now   Sodium levels dropped from abt 136 last month to 127 now   Cont IVF ,  Cont celexa for now ,    f/u with rpt BMP this PM   F/u with urine studies  TSH from 4/5  Is nml     Dementia- cont supportive care     HTN - controlled on home med s amlodipine, lopressor, holding lisnopril for now       DVT Prophylaxis: lovenox   Diet: DIET GENERAL;  Code Status: Full Code    PT/OT Eval Status: ordered    Dispo - 3 days        Zamarthastad, MD    Thank you CHRISTINA Bashir CNP for the opportunity to be involved in this patient's care. If you have any questions or concerns please feel free to contact me at [phone].

## 2019-04-07 LAB
ANION GAP SERPL CALCULATED.3IONS-SCNC: 10 MMOL/L (ref 3–16)
ANION GAP SERPL CALCULATED.3IONS-SCNC: 10 MMOL/L (ref 3–16)
BASOPHILS ABSOLUTE: 0.1 K/UL (ref 0–0.2)
BASOPHILS RELATIVE PERCENT: 1.2 %
BUN BLDV-MCNC: 28 MG/DL (ref 7–20)
BUN BLDV-MCNC: 32 MG/DL (ref 7–20)
CALCIUM SERPL-MCNC: 8.5 MG/DL (ref 8.3–10.6)
CALCIUM SERPL-MCNC: 8.6 MG/DL (ref 8.3–10.6)
CHLORIDE BLD-SCNC: 102 MMOL/L (ref 99–110)
CHLORIDE BLD-SCNC: 105 MMOL/L (ref 99–110)
CO2: 20 MMOL/L (ref 21–32)
CO2: 22 MMOL/L (ref 21–32)
CREAT SERPL-MCNC: 1.2 MG/DL (ref 0.6–1.2)
CREAT SERPL-MCNC: 1.3 MG/DL (ref 0.6–1.2)
EOSINOPHILS ABSOLUTE: 0.3 K/UL (ref 0–0.6)
EOSINOPHILS RELATIVE PERCENT: 4.4 %
GFR AFRICAN AMERICAN: 49
GFR AFRICAN AMERICAN: 53
GFR NON-AFRICAN AMERICAN: 40
GFR NON-AFRICAN AMERICAN: 44
GLUCOSE BLD-MCNC: 131 MG/DL (ref 70–99)
GLUCOSE BLD-MCNC: 171 MG/DL (ref 70–99)
HCT VFR BLD CALC: 28.6 % (ref 36–48)
HEMOGLOBIN: 9.7 G/DL (ref 12–16)
LYMPHOCYTES ABSOLUTE: 1.9 K/UL (ref 1–5.1)
LYMPHOCYTES RELATIVE PERCENT: 25.3 %
MCH RBC QN AUTO: 29.8 PG (ref 26–34)
MCHC RBC AUTO-ENTMCNC: 34 G/DL (ref 31–36)
MCV RBC AUTO: 87.6 FL (ref 80–100)
MONOCYTES ABSOLUTE: 0.7 K/UL (ref 0–1.3)
MONOCYTES RELATIVE PERCENT: 9.2 %
NEUTROPHILS ABSOLUTE: 4.4 K/UL (ref 1.7–7.7)
NEUTROPHILS RELATIVE PERCENT: 59.9 %
PDW BLD-RTO: 13.8 % (ref 12.4–15.4)
PLATELET # BLD: 441 K/UL (ref 135–450)
PMV BLD AUTO: 7.3 FL (ref 5–10.5)
POTASSIUM REFLEX MAGNESIUM: 5 MMOL/L (ref 3.5–5.1)
POTASSIUM SERPL-SCNC: 4.5 MMOL/L (ref 3.5–5.1)
RBC # BLD: 3.26 M/UL (ref 4–5.2)
SODIUM BLD-SCNC: 134 MMOL/L (ref 136–145)
SODIUM BLD-SCNC: 135 MMOL/L (ref 136–145)
WBC # BLD: 7.3 K/UL (ref 4–11)

## 2019-04-07 PROCEDURE — 6360000002 HC RX W HCPCS: Performed by: FAMILY MEDICINE

## 2019-04-07 PROCEDURE — 85025 COMPLETE CBC W/AUTO DIFF WBC: CPT

## 2019-04-07 PROCEDURE — 97530 THERAPEUTIC ACTIVITIES: CPT

## 2019-04-07 PROCEDURE — 97161 PT EVAL LOW COMPLEX 20 MIN: CPT

## 2019-04-07 PROCEDURE — 1200000000 HC SEMI PRIVATE

## 2019-04-07 PROCEDURE — 2580000003 HC RX 258: Performed by: INTERNAL MEDICINE

## 2019-04-07 PROCEDURE — 2580000003 HC RX 258: Performed by: FAMILY MEDICINE

## 2019-04-07 PROCEDURE — 6370000000 HC RX 637 (ALT 250 FOR IP): Performed by: FAMILY MEDICINE

## 2019-04-07 PROCEDURE — 36415 COLL VENOUS BLD VENIPUNCTURE: CPT

## 2019-04-07 PROCEDURE — 6370000000 HC RX 637 (ALT 250 FOR IP): Performed by: INTERNAL MEDICINE

## 2019-04-07 PROCEDURE — 80048 BASIC METABOLIC PNL TOTAL CA: CPT

## 2019-04-07 RX ADMIN — METOPROLOL TARTRATE 100 MG: 50 TABLET ORAL at 09:11

## 2019-04-07 RX ADMIN — SODIUM CHLORIDE, PRESERVATIVE FREE 10 ML: 5 INJECTION INTRAVENOUS at 09:11

## 2019-04-07 RX ADMIN — SODIUM CHLORIDE: 9 INJECTION, SOLUTION INTRAVENOUS at 17:26

## 2019-04-07 RX ADMIN — SIMVASTATIN 40 MG: 40 TABLET, FILM COATED ORAL at 20:29

## 2019-04-07 RX ADMIN — ASPIRIN 81 MG: 81 TABLET, COATED ORAL at 09:11

## 2019-04-07 RX ADMIN — Medication 1 TABLET: at 09:11

## 2019-04-07 RX ADMIN — ENOXAPARIN SODIUM 40 MG: 40 INJECTION SUBCUTANEOUS at 09:10

## 2019-04-07 RX ADMIN — CITALOPRAM HYDROBROMIDE 20 MG: 20 TABLET ORAL at 09:11

## 2019-04-07 RX ADMIN — MEMANTINE 5 MG: 5 TABLET ORAL at 09:11

## 2019-04-07 RX ADMIN — AMLODIPINE BESYLATE 10 MG: 5 TABLET ORAL at 09:11

## 2019-04-07 ASSESSMENT — ENCOUNTER SYMPTOMS
RESPIRATORY NEGATIVE: 1
VOMITING: 0
DIARRHEA: 0
CONSTIPATION: 0
NAUSEA: 0
BLOOD IN STOOL: 0
ABDOMINAL PAIN: 1

## 2019-04-07 ASSESSMENT — PAIN SCALES - GENERAL
PAINLEVEL_OUTOF10: 0
PAINLEVEL_OUTOF10: 0

## 2019-04-07 NOTE — PLAN OF CARE
Bed locked in lowest position. Avasys on. Bed alarm on. Call light within reach.  at bedside. Side rails up 2/4. Nonskid socks on.

## 2019-04-07 NOTE — PROGRESS NOTES
Physical Therapy    Facility/Department: [facility] C3 TELE/MED SURG/ONC  Initial Assessment/ 1 x only, no skilled PT needs    NAME: Clair Jenkins  : 1945  MRN: [id number]    Date of Service: 2019    Discharge Recommendations:  24 hour supervision or assist   PT Equipment Recommendations  Equipment Needed: No    Assessment   Assessment: 67 yo female adm with UTI, encephalopathy, hx dementia. PLOF indep amb without device, spouse stands by on step into home and set up pt for bed and bath. Today pt demonstrates baseline function for transfers and ambulation, requiring supervision due to dementia and IV only. She is safe to amb with [facility] staff, RN informed. No additional PT planned. Discharge home as prior  Prognosis: Excellent  Decision Making: Low Complexity  Patient Education: role of PT, safety, safe to amb with nsg supervision  Barriers to Learning: pt and spouse voice understanding  REQUIRES PT FOLLOW UP: No  Activity Tolerance  Activity Tolerance: Patient Tolerated treatment well;Patient limited by cognitive status       Patient Diagnosis(es): The primary encounter diagnosis was Hyponatremia. Diagnoses of TAYLOR (acute kidney injury) ([address] History of UTI, Thrombocytosis ([address] and Generalized weakness were also pertinent to this visit. has a past medical history of Arthritis, Depression, Eczema, Hyperlipidemia, Hypertension, and Osteoarthritis. has a past surgical history that includes Tonsillectomy.     Restrictions  Restrictions/Precautions  Restrictions/Precautions: General Precautions, Up as Tolerated, Fall Risk  Vision/Hearing  Vision: Within Functional Limits  Hearing: Within functional limits     Subjective  General  Chart Reviewed: Yes  Patient assessed for rehabilitation services?: Yes  Referring Practitioner: Karly Pierre  Referral Date : 19  Diagnosis: enceph  Follows Commands: Impaired(hx dementia, follows familiar commands and automatic activity)  General Comment  Comments: RN cleared pt for therapy, pt sitting EOB on approach  Subjective  Subjective: Agrees to therapy  Pain Screening  Patient Currently in Pain: Denies    Orientation  Orientation  Overall Orientation Status: Impaired  Orientation Level: Oriented to person;Disoriented to time;Disoriented to situation;Disoriented to place(able to give name , not )  Social/Functional History  Social/Functional History  Lives With: Spouse  Type of Home: House  Home Layout: One level  Home Access: (one step access, spouse stands by entering home)  ADL Assistance: (spouse sets out clothes and gets pt set up in shower, she then completes washing herself indep)  Ambulation Assistance: Independent(no device)  Transfer Assistance: Independent  Additional Comments: Attends senior day care program 2 days week when spouse works part time    Objective        AROM RLE (degrees)  RLE AROM: WFL  AROM LLE (degrees)  LLE AROM : WFL  Strength RLE  Strength RLE: WFL  Strength LLE  Strength LLE: WFL        Bed mobility  Comment: not observed, pt sitting EOB on approach and up in chair on exit  Transfers  Sit to Stand: Independent  Stand to sit: Independent  Comment: supervision for IV and due to dementia  Ambulation  Surface: level tile  Device: No Device  Assistance: Supervision  Quality of Gait: wide base of support with mild glut med lurch bilat, flat footed, no loss of balance, appropriate pace and stride  Distance: 200 ft  Comments: supervision due to dementia and IV, safe to amb with [facility] [place], nurse informed     Plan   Times per week: 1 x only  Safety Devices  Type of devices:  All fall risk precautions in place, Left in chair, Call light within reach, Chair alarm in place, Nurse notified, Patient at risk for falls, Gait belt    AM-PAC Score  AM-PAC Inpatient Mobility Raw Score : 23  AM-PAC Inpatient T-Scale Score : 56.93  Mobility Inpatient CMS 0-100% Score: 11.2  Mobility Inpatient CMS G-Code Modifier : CI     Goals  Short term goals  Time Frame for Short term goals: 1 visit  Short term goal 1: pt and spouse to voice understanding of up with staff assist/safety guidelines- met  Short term goal 2: pt to demonstrate supervised amb 200 ft without device- met  Patient Goals   Patient goals : pt does not voice goal due to dementia     Therapy Time   Individual Concurrent Group Co-treatment   Time In 0935         Time Out 0955         Minutes 20         Timed Code Treatment Minutes: [address], [id number]

## 2019-04-07 NOTE — PROGRESS NOTES
Shift assessment completed. Pt alert to person only. She is very cooperative and pleasant. VSS. Denies any needs at this time. Bed locked and in lowest position, call light within reach, side rails up 2/4. Bed alarm on. Avasys on. Will continue to monitor.

## 2019-04-07 NOTE — PLAN OF CARE
Up in room today to bathroom. Is pleasant but confused. Did need simple direction for movement like where to sit on toilet and to put pill in mouth for medication time. Family at bedside. Bed alarm on and chair alarm as needed. Pleasant and cooperative with simple instructions. Able to eat meals. Continent of urine and bowel. Walked in mayes with PT. Call light in reach. Watched TV today with family.

## 2019-04-07 NOTE — PROGRESS NOTES
Occupational Therapy    Orders received, chart reviewed, attempted to initiate OT eval this date. Patient/Family declined acute OT services. Per family report, patient is at baseline with all mobility, transfers, ADLs and has 24 S/A at home from spouse. Will sign off OT. Please reorder if patient has change in status.    [facility] [id number]

## 2019-04-07 NOTE — PROGRESS NOTES
Hospitalist Progress Note      PCP: Gerardo Velasquez APRN - CNP    Date of Admission: 4/6/2019    Chief Complaint: Weakness     Hospital Course: Reviewed H&P     Subjective: Patient seen sitting in chair at bedside with family around. Family reports patient appears improved since presentation. Patient has Baseline dementia and is AAO ×1 . Currently patient offers no complaints. - Labs showed improvement in renal functions and hyponatremia . Decrease IV fluids and elevated PT/OT evaluation to assess discharge needs    Medications:  Reviewed    Infusion Medications    sodium chloride 100 mL/hr at 04/06/19 1909     Scheduled Medications    amLODIPine  10 mg Oral Daily    aspirin  81 mg Oral Daily    citalopram  20 mg Oral Daily    memantine  5 mg Oral Daily    metoprolol tartrate  100 mg Oral Daily    therapeutic multivitamin-minerals  1 tablet Oral Daily    simvastatin  40 mg Oral Nightly    sodium chloride flush  10 mL Intravenous 2 times per day    enoxaparin  40 mg Subcutaneous Daily     PRN Meds: sodium chloride flush, magnesium hydroxide, ondansetron      Intake/Output Summary (Last 24 hours) at 4/7/2019 1313  Last data filed at 4/7/2019 1211  Gross per 24 hour   Intake 840 ml   Output --   Net 840 ml       Physical Exam Performed:    /65   Pulse 70   Temp 98 °F (36.7 °C) (Oral)   Resp 16   Ht 5' 1\" (1.549 m)   Wt 150 lb (68 kg)   LMP  (LMP Unknown)   SpO2 98%   BMI 28.34 kg/m²     General appearance:  No apparent distress, appears stated age and cooperative. HEENT:  Normal cephalic, atraumatic without obvious deformity. Pupils equal, round, and reactive to light. Extra ocular muscles intact. Conjunctivae/corneas clear. Neck: Supple, with full range of motion. No jugular venous distention. Trachea midline. Respiratory:  Normal respiratory effort. Clear to auscultation, bilaterally without Rales/Wheezes/Rhonchi.   Cardiovascular:  Regular rate and rhythm with normal S1/S2 without murmurs, rubs or gallops. Abdomen: Soft, non-tender, non-distended with normal bowel sounds. Musculoskeletal:  No clubbing, cyanosis or edema bilaterally. Full range of motion without deformity. Skin: Skin color, texture, turgor normal.  No rashes or lesions. Neurologic:  Neurovascularly intact without any focal sensory/motor deficits. Cranial nerves: II-XII intact, grossly non-focal.  Psychiatric:  Alert and oriented, thought content short term memory loss   Capillary Refill: Brisk,< 3 seconds   Peripheral Pulses: +2 palpable, equal bilaterally     Labs:   Recent Labs     04/05/19  1622 04/06/19  1516 04/07/19  0532   WBC 10.6 9.5 7.3   HGB 11.0* 10.2* 9.7*   HCT 32.6* 30.1* 28.6*   * 465* 441     Recent Labs     04/06/19  1516 04/06/19  2328 04/07/19  0532   * 134* 135*   K 4.6 4.5 5.0   CL 93* 102 105   CO2 21 22 20*   BUN 37* 32* 28*   CREATININE 1.4* 1.3* 1.2   CALCIUM 8.8 8.5 8.6   PHOS 3.7  --   --      Recent Labs     04/05/19  1622 04/06/19  1516   AST 19 19   ALT 14 15   BILITOT 0.4 0.3   ALKPHOS 77 66     No results for input(s): INR in the last 72 hours. Recent Labs     04/05/19  1622   TROPONINI <0.01       Urinalysis:    Lab Results   Component Value Date    NITRU Negative 04/06/2019    WBCUA 6-10 04/06/2019    BACTERIA Rare 04/06/2019    RBCUA 3-5 04/06/2019    BLOODU Negative 04/06/2019    SPECGRAV <=1.005 04/06/2019    GLUCOSEU Negative 04/06/2019     Active Hospital Problems    Diagnosis Date Noted    Encephalopathy [G93.40] 04/06/2019     Assessment/Plan:  1. Acute Metabolic encephalopathy in the setting of Baseline Dementia secondary to Possible TAYLOR and Acute Hyponatremia   - Pt with dementia, rx for UTI recently with bactrim , presents with inc weakness, poor po intake and Diarrhea   - Currently diarrhea resolves; sodium levels improved to baseline with IV hydration and TAYLOR  resolved  - Decrease IVF     2.  Dementia w/o behavioral disturbances- cont supportive care    3. HTN - controlled on home med s amlodipine, lopressor, holding lisnopril for now - will resume when able      DVT Prophylaxis: Lovenox   Diet: DIET GENERAL;  Code Status: Full Code    PT/OT Eval Status: Consulted     Dispo - Likely tomorrow      The note was completed using Dragon -speech recognition software & EMR  . Every effort was made to ensure accuracy; however, inadvertent computerized transcription errors may be present.     Damian Boss MD

## 2019-04-08 VITALS
DIASTOLIC BLOOD PRESSURE: 79 MMHG | TEMPERATURE: 98 F | RESPIRATION RATE: 18 BRPM | HEIGHT: 61 IN | WEIGHT: 150 LBS | HEART RATE: 66 BPM | SYSTOLIC BLOOD PRESSURE: 137 MMHG | BODY MASS INDEX: 28.32 KG/M2 | OXYGEN SATURATION: 96 %

## 2019-04-08 LAB
ANION GAP SERPL CALCULATED.3IONS-SCNC: 9 MMOL/L (ref 3–16)
BUN BLDV-MCNC: 18 MG/DL (ref 7–20)
CALCIUM SERPL-MCNC: 8.7 MG/DL (ref 8.3–10.6)
CHLORIDE BLD-SCNC: 103 MMOL/L (ref 99–110)
CO2: 24 MMOL/L (ref 21–32)
CREAT SERPL-MCNC: 1 MG/DL (ref 0.6–1.2)
GFR AFRICAN AMERICAN: >60
GFR NON-AFRICAN AMERICAN: 54
GLUCOSE BLD-MCNC: 103 MG/DL (ref 70–99)
HCT VFR BLD CALC: 29.8 % (ref 36–48)
HEMOGLOBIN: 10 G/DL (ref 12–16)
MCH RBC QN AUTO: 29.7 PG (ref 26–34)
MCHC RBC AUTO-ENTMCNC: 33.4 G/DL (ref 31–36)
MCV RBC AUTO: 88.9 FL (ref 80–100)
PDW BLD-RTO: 14.2 % (ref 12.4–15.4)
PLATELET # BLD: 471 K/UL (ref 135–450)
PMV BLD AUTO: 7.1 FL (ref 5–10.5)
POTASSIUM REFLEX MAGNESIUM: 4.9 MMOL/L (ref 3.5–5.1)
RBC # BLD: 3.35 M/UL (ref 4–5.2)
SODIUM BLD-SCNC: 136 MMOL/L (ref 136–145)
WBC # BLD: 8.2 K/UL (ref 4–11)

## 2019-04-08 PROCEDURE — 6370000000 HC RX 637 (ALT 250 FOR IP): Performed by: FAMILY MEDICINE

## 2019-04-08 PROCEDURE — 6370000000 HC RX 637 (ALT 250 FOR IP): Performed by: INTERNAL MEDICINE

## 2019-04-08 PROCEDURE — 36415 COLL VENOUS BLD VENIPUNCTURE: CPT

## 2019-04-08 PROCEDURE — 85027 COMPLETE CBC AUTOMATED: CPT

## 2019-04-08 PROCEDURE — 80048 BASIC METABOLIC PNL TOTAL CA: CPT

## 2019-04-08 PROCEDURE — 6360000002 HC RX W HCPCS: Performed by: FAMILY MEDICINE

## 2019-04-08 RX ADMIN — Medication 1 TABLET: at 09:26

## 2019-04-08 RX ADMIN — MEMANTINE 5 MG: 5 TABLET ORAL at 09:26

## 2019-04-08 RX ADMIN — ENOXAPARIN SODIUM 40 MG: 40 INJECTION SUBCUTANEOUS at 09:27

## 2019-04-08 RX ADMIN — METOPROLOL TARTRATE 100 MG: 50 TABLET ORAL at 09:26

## 2019-04-08 RX ADMIN — CITALOPRAM HYDROBROMIDE 20 MG: 20 TABLET ORAL at 09:27

## 2019-04-08 RX ADMIN — ASPIRIN 81 MG: 81 TABLET, COATED ORAL at 09:27

## 2019-04-08 RX ADMIN — AMLODIPINE BESYLATE 10 MG: 5 TABLET ORAL at 09:26

## 2019-04-08 NOTE — PROGRESS NOTES
Pt a/o to self and situation. VSS.  present. All prescriptions, discharge and follow up instructions given to the patient and . The  verbalizes understanding and denies questions. All belongings collected and sent with the patient. The patient was discharged off the unit by wheelchair and ambassador in stable condition.

## 2019-04-08 NOTE — DISCHARGE SUMMARY
Hospital Medicine Discharge Summary    Patient ID: Kathy Yanez      Patient's PCP: Raven Salgado, APRN - CNP    Admit Date: 4/6/2019     Discharge Date:  04/08/19    Admitting Physician: Chava Edmond MD     Discharge Physician: Alisa Dupree MD     Discharge Diagnoses: Active Hospital Problems    Diagnosis Date Noted    Encephalopathy [G93.40] 04/06/2019       The patient was seen and examined on day of discharge and this discharge summary is in conjunction with any daily progress note from day of discharge. Hospital Course: By problem List   1. Acute Metabolic encephalopathy in the setting of Baseline Dementia secondary to  TAYLOR and Acute Hyponatremia   - Pt with dementia, rx for UTI recently with bactrim , presents with increasing  weakness, poor PO  intake and Diarrhea   - Currently diarrhea resolved ;  sodium levels improved to baseline with IV hydration and A KI  resolved  - No changes were made to Home regimen       2. Dementia w/o behavioral disturbances- cont supportive care      3. HTN - controlled on home meds-  amlodipine, lopressor, held Lisinopril on admission given TAYLOR and  resumed when able      Patient evaluated by PT/OT and was recommended 24 hr Supervision or assist , which  provides . Patient d/cd home in stable medical condition     Physical Exam Performed:   /79   Pulse 66   Temp 98.1 °F (36.7 °C) (Oral)   Resp 16   Ht 5' 1\" (1.549 m)   Wt 150 lb (68 kg)   LMP  (LMP Unknown)   SpO2 97%   BMI 28.34 kg/m²       General appearance:  No apparent distress, appears stated age and cooperative. HEENT:  Normal cephalic, atraumatic without obvious deformity. Pupils equal, round, and reactive to light. Extra ocular muscles intact. Conjunctivae/corneas clear. Neck: Supple, with full range of motion. No jugular venous distention. Trachea midline. Respiratory:  Normal respiratory effort.  Clear to auscultation, bilaterally without Rales/Wheezes/Rhonchi. Cardiovascular:  Regular rate and rhythm with normal S1/S2 without murmurs, rubs or gallops. Abdomen: Soft, non-tender, non-distended with normal bowel sounds. Musculoskeletal:  No clubbing, cyanosis or edema bilaterally. Full range of motion without deformity. Skin: Skin color, texture, turgor normal.  No rashes or lesions. Neurologic:  Neurovascularly intact without any focal sensory/motor deficits. Cranial nerves: II-XII intact, grossly non-focal.  Psychiatric:  Alert and oriented, thought content appropriate, normal insight  Capillary Refill: Brisk,< 3 seconds   Peripheral Pulses: +2 palpable, equal bilaterally     Labs:  For convenience and continuity at follow-up the following most recent labs are provided:      CBC:    Lab Results   Component Value Date    WBC 7.3 04/07/2019    HGB 9.7 04/07/2019    HCT 28.6 04/07/2019     04/07/2019       Renal:    Lab Results   Component Value Date     04/07/2019    K 5.0 04/07/2019     04/07/2019    CO2 20 04/07/2019    BUN 28 04/07/2019    CREATININE 1.2 04/07/2019    CALCIUM 8.6 04/07/2019    PHOS 3.7 04/06/2019     Consults:   IP CONSULT TO HOSPITALIST  IP CONSULT TO SOCIAL WORK    Disposition: Home     Condition at Discharge: Stable    Discharge Instructions/Follow-up:   Follow-up With Details Why Contact CHRISTINA Slade CNP Schedule an appointment as soon as possible for a visit in 1 week Hospital discharge f/up  [address] [address]  [phone]     Code Status:  Full Code     Activity: activity as tolerated    Diet: regular diet      Discharge Medications:  Current Discharge Medication List           Details   amLODIPine (NORVASC) 10 MG tablet Take 1 tablet by mouth daily  Qty: 90 tablet, Refills: 1    Associated Diagnoses: Essential hypertension      citalopram (CELEXA) 20 MG tablet TAKE 1 TABLET EVERY DAY  Qty: 90 tablet, Refills: 1    Associated Diagnoses: Dysthymia      lisinopril (PRINIVIL;ZESTRIL) 20 MG tablet Take 1 tablet by mouth daily  Qty: 90 tablet, Refills: 1    Associated Diagnoses: Essential hypertension      memantine ER (NAMENDA XR) 7 MG CP24 extended release capsule TAKE 1 CAPSULE EVERY DAY  Qty: 90 capsule, Refills: 1    Associated Diagnoses: Late onset Alzheimer's disease with behavioral disturbance      metoprolol (LOPRESSOR) 100 MG tablet TAKE 1 TABLET EVERY DAY  Qty: 90 tablet, Refills: 1    Associated Diagnoses: Essential hypertension      simvastatin (ZOCOR) 40 MG tablet TAKE 1 TABLET EVERY NIGHT  Qty: 90 tablet, Refills: 1    Associated Diagnoses: Hyperlipidemia, unspecified hyperlipidemia type      etodolac (LODINE) 400 MG tablet Take 1 tablet by mouth daily As needed for pain  Qty: 90 tablet, Refills: 1    Associated Diagnoses: Primary osteoarthritis involving multiple joints      Calcium Carb-Cholecalciferol (CALCIUM 1000 + D PO) Take by mouth      Multiple Vitamins-Minerals (THERAPEUTIC MULTIVITAMIN-MINERALS) tablet Take 1 tablet by mouth daily. aspirin 81 MG tablet Take 81 mg by mouth daily. Time Spent on discharge is more than 30 minutes in the examination, evaluation, counseling and review of medications and discharge plan. Signed:    Mark Cruz MD   4/8/2019      Thank you CHRISTINA Gage CNP for the opportunity to be involved in this patient's care. If you have any questions or concerns please feel free to contact me at [phone].

## 2019-04-08 NOTE — PROGRESS NOTES
Pt assessment completed and charted. VSS. Pt a/o to self only. Pt denies p/n/v.  @ bedside. Bed in lowest position and wheels locked. Call light within reach. Bedside table within reach. Non-skid footwear in place. Pt denies any other needs at this time. Pt calls out appropriately. Will continue to monitor.

## 2019-04-08 NOTE — PLAN OF CARE
Bed alarm on. Bed locked in lowest position. Side rails up 2/4. Nonskid socks on. Call light within reach. Avasys on.

## 2019-04-08 NOTE — PROGRESS NOTES
Shift assessment completed. Pt A&O to person only. Pt very pleasant and cooperative. VSS. Denies any needs at this time. Bed alarm on.  at bedside. Avasys on. Bed locked and in lowest position, call light within reach, side rails up 2/4. Will continue to monitor.

## 2019-04-08 NOTE — CARE COORDINATION
CASE MANAGEMENT INITIAL ASSESSMENT      Reviewed chart and met with patient today, re:  68 y.o. Female order:hx dementia lives in own home  Explained Case Management role/services. Family present:   Primary contact information: encephalopathy    Admit date/status: 4/6/19  Diagnosis: encephalopathy    Insurance: humana  Precert required for SNF - Y     3 night stay required - N    Living arrangements, Adls, care needs, prior to admission: lives in ranch style home with her     Transportation: private    [employer] at home: Walker__Cane__RTS__ BSC__Shower Chair__  02__ HHN__ CPAP__  BiPap__  Hospital Bed__ W/C___ Other__________    Services in the home and/or outpatient, prior to admission: none    PT/OT recs: no needs    Hospital Exemption Notification (HEN): needed for SNF    Barriers to discharge: none    Plan/comments:  spoke with patient and . Stated plan to return home at discharge. Reports she is independent in ADL's he will be able to get her to any follow up appointments and should have no DCP needs. Please notify should needs arise.  Elvi Evans RN    ECOC on chart for MD signature

## 2019-04-11 ENCOUNTER — TELEPHONE (OUTPATIENT)
Dept: FAMILY MEDICINE CLINIC | Age: 74
End: 2019-04-11

## 2019-04-11 NOTE — TELEPHONE ENCOUNTER
[facility] Transitions Initial Follow Up Call    Outreach made within 2 business days of discharge: Yes    Patient: Marii Coreas Patient : 1945   MRN: [id number]  Reason for Admission: There are no discharge diagnoses documented for the most recent discharge. Discharge Date: 19       Spoke with:     Discharge department/facility: [facility]     TCM Interactive Patient Contact:  Was patient able to fill all prescriptions: Yes  Was patient instructed to bring all medications to the follow-up visit: Yes  Is patient taking all medications as directed in the discharge summary?  Yes  Does patient understand their discharge instructions: Yes  Does patient have questions or concerns that need addressed prior to 7-14 day follow up office visit: no    Scheduled appointment with PCP within 7-14 days    Follow Up  Future Appointments   Date Time Provider Margie Bennett   2019  3:45 PM CHRISTINA Meyer - CNP [facility]   2019 11:00 AM Jeremy Hernandez MD [facility]   5/3/2019  1:00 PM JENNIFER LEWIS  [address] [facility]

## 2019-04-16 ENCOUNTER — OFFICE VISIT (OUTPATIENT)
Dept: FAMILY MEDICINE CLINIC | Age: 74
End: 2019-04-16
Payer: MEDICARE

## 2019-04-16 VITALS
HEART RATE: 70 BPM | DIASTOLIC BLOOD PRESSURE: 68 MMHG | SYSTOLIC BLOOD PRESSURE: 124 MMHG | WEIGHT: 158 LBS | OXYGEN SATURATION: 97 % | BODY MASS INDEX: 29.85 KG/M2 | RESPIRATION RATE: 16 BRPM

## 2019-04-16 DIAGNOSIS — G30.1 LATE ONSET ALZHEIMER'S DISEASE WITH BEHAVIORAL DISTURBANCE (HCC): ICD-10-CM

## 2019-04-16 DIAGNOSIS — Z09 HOSPITAL DISCHARGE FOLLOW-UP: ICD-10-CM

## 2019-04-16 DIAGNOSIS — D64.9 ANEMIA, UNSPECIFIED TYPE: ICD-10-CM

## 2019-04-16 DIAGNOSIS — R93.89 ENDOMETRIAL THICKENING ON ULTRASOUND: ICD-10-CM

## 2019-04-16 DIAGNOSIS — F02.818 LATE ONSET ALZHEIMER'S DISEASE WITH BEHAVIORAL DISTURBANCE (HCC): ICD-10-CM

## 2019-04-16 PROCEDURE — 99495 TRANSJ CARE MGMT MOD F2F 14D: CPT | Performed by: NURSE PRACTITIONER

## 2019-04-16 PROCEDURE — 1111F DSCHRG MED/CURRENT MED MERGE: CPT | Performed by: NURSE PRACTITIONER

## 2019-04-16 NOTE — PROGRESS NOTES
kg)     Body mass index is 29.85 kg/m². Wt Readings from Last 3 Encounters:   04/16/19 158 lb (71.7 kg)   04/06/19 150 lb (68 kg)   04/05/19 156 lb (70.8 kg)     BP Readings from Last 3 Encounters:   04/16/19 124/68   04/08/19 137/79   04/05/19 124/80        Physical Exam:  General Appearance: alert and oriented to person, well developed and well- nourished, in no acute distress  Skin: warm and dry, no rash or erythema  Eyes: pupils equal, round, and reactive to light, extraocular eye movements intact, conjunctivae normal  Neck: supple and non-tender without mass, no thyromegaly or thyroid nodules, no cervical lymphadenopathy  Pulmonary/Chest: clear to auscultation bilaterally- no wheezes, rales or rhonchi, normal air movement, no respiratory distress  Cardiovascular: normal rate, regular rhythm, normal S1 and S2, no murmurs, rubs, clicks, or gallops, distal pulses intact, no carotid bruits  Abdomen: soft, non-tender, non-distended, normal bowel sounds, no masses or organomegaly  Extremities: no cyanosis, clubbing or edema  Musculoskeletal: normal range of motion, no joint swelling, deformity or tenderness  Neurologic: reflexes normal and symmetric, no cranial nerve deficit, gait, coordination and speech normal    Assessment/Plan:  1. Anemia, unspecified type    - CBC Auto Differential; Future  - Iron and TIBC; Future  - Ferritin; Future  - VITAMIN B12 & FOLATE; Future    2. Late onset Alzheimer's disease with behavioral disturbance  - stable, back to baseline. Sees neuro. 3. Endometrial thickening on ultrasound  - has gyn zeke scheduled     4.  Hospital discharge follow-up       Medical Decision Making: moderate complexity

## 2019-04-29 ENCOUNTER — OFFICE VISIT (OUTPATIENT)
Dept: NEUROLOGY | Age: 74
End: 2019-04-29
Payer: MEDICARE

## 2019-04-29 ENCOUNTER — TELEPHONE (OUTPATIENT)
Dept: NEUROLOGY | Age: 74
End: 2019-04-29

## 2019-04-29 ENCOUNTER — OFFICE VISIT (OUTPATIENT)
Dept: OBGYN CLINIC | Age: 74
End: 2019-04-29
Payer: MEDICARE

## 2019-04-29 VITALS
HEIGHT: 60 IN | HEART RATE: 59 BPM | WEIGHT: 158.2 LBS | DIASTOLIC BLOOD PRESSURE: 66 MMHG | SYSTOLIC BLOOD PRESSURE: 132 MMHG | TEMPERATURE: 97.5 F | BODY MASS INDEX: 31.06 KG/M2

## 2019-04-29 VITALS
DIASTOLIC BLOOD PRESSURE: 68 MMHG | OXYGEN SATURATION: 94 % | WEIGHT: 158 LBS | HEART RATE: 65 BPM | SYSTOLIC BLOOD PRESSURE: 122 MMHG | BODY MASS INDEX: 29.08 KG/M2 | HEIGHT: 62 IN

## 2019-04-29 DIAGNOSIS — I10 ESSENTIAL HYPERTENSION: ICD-10-CM

## 2019-04-29 DIAGNOSIS — E78.5 DYSLIPIDEMIA: ICD-10-CM

## 2019-04-29 DIAGNOSIS — R93.89 ENDOMETRIAL THICKENING ON ULTRASOUND: Primary | ICD-10-CM

## 2019-04-29 DIAGNOSIS — F02.818 LATE ONSET ALZHEIMER'S DISEASE WITH BEHAVIORAL DISTURBANCE (HCC): Primary | ICD-10-CM

## 2019-04-29 DIAGNOSIS — F34.1 DYSTHYMIA: ICD-10-CM

## 2019-04-29 DIAGNOSIS — G30.1 LATE ONSET ALZHEIMER'S DISEASE WITH BEHAVIORAL DISTURBANCE (HCC): Primary | ICD-10-CM

## 2019-04-29 DIAGNOSIS — F51.01 PRIMARY INSOMNIA: ICD-10-CM

## 2019-04-29 PROCEDURE — 3017F COLORECTAL CA SCREEN DOC REV: CPT | Performed by: PSYCHIATRY & NEUROLOGY

## 2019-04-29 PROCEDURE — 1123F ACP DISCUSS/DSCN MKR DOCD: CPT | Performed by: OBSTETRICS & GYNECOLOGY

## 2019-04-29 PROCEDURE — 1111F DSCHRG MED/CURRENT MED MERGE: CPT | Performed by: OBSTETRICS & GYNECOLOGY

## 2019-04-29 PROCEDURE — 99214 OFFICE O/P EST MOD 30 MIN: CPT | Performed by: PSYCHIATRY & NEUROLOGY

## 2019-04-29 PROCEDURE — 1111F DSCHRG MED/CURRENT MED MERGE: CPT | Performed by: PSYCHIATRY & NEUROLOGY

## 2019-04-29 PROCEDURE — G8427 DOCREV CUR MEDS BY ELIG CLIN: HCPCS | Performed by: PSYCHIATRY & NEUROLOGY

## 2019-04-29 PROCEDURE — 4040F PNEUMOC VAC/ADMIN/RCVD: CPT | Performed by: PSYCHIATRY & NEUROLOGY

## 2019-04-29 PROCEDURE — 1123F ACP DISCUSS/DSCN MKR DOCD: CPT | Performed by: PSYCHIATRY & NEUROLOGY

## 2019-04-29 PROCEDURE — G8417 CALC BMI ABV UP PARAM F/U: HCPCS | Performed by: OBSTETRICS & GYNECOLOGY

## 2019-04-29 PROCEDURE — G8427 DOCREV CUR MEDS BY ELIG CLIN: HCPCS | Performed by: OBSTETRICS & GYNECOLOGY

## 2019-04-29 PROCEDURE — 4040F PNEUMOC VAC/ADMIN/RCVD: CPT | Performed by: OBSTETRICS & GYNECOLOGY

## 2019-04-29 PROCEDURE — G8419 CALC BMI OUT NRM PARAM NOF/U: HCPCS | Performed by: PSYCHIATRY & NEUROLOGY

## 2019-04-29 PROCEDURE — G8399 PT W/DXA RESULTS DOCUMENT: HCPCS | Performed by: OBSTETRICS & GYNECOLOGY

## 2019-04-29 PROCEDURE — 3017F COLORECTAL CA SCREEN DOC REV: CPT | Performed by: OBSTETRICS & GYNECOLOGY

## 2019-04-29 PROCEDURE — 1036F TOBACCO NON-USER: CPT | Performed by: PSYCHIATRY & NEUROLOGY

## 2019-04-29 PROCEDURE — 1036F TOBACCO NON-USER: CPT | Performed by: OBSTETRICS & GYNECOLOGY

## 2019-04-29 PROCEDURE — G8399 PT W/DXA RESULTS DOCUMENT: HCPCS | Performed by: PSYCHIATRY & NEUROLOGY

## 2019-04-29 PROCEDURE — 1090F PRES/ABSN URINE INCON ASSESS: CPT | Performed by: OBSTETRICS & GYNECOLOGY

## 2019-04-29 PROCEDURE — 1090F PRES/ABSN URINE INCON ASSESS: CPT | Performed by: PSYCHIATRY & NEUROLOGY

## 2019-04-29 PROCEDURE — 99202 OFFICE O/P NEW SF 15 MIN: CPT | Performed by: OBSTETRICS & GYNECOLOGY

## 2019-04-29 ASSESSMENT — ENCOUNTER SYMPTOMS
VOMITING: 0
CONSTIPATION: 1
NAUSEA: 0
BACK PAIN: 0
ABDOMINAL PAIN: 1
DIARRHEA: 0

## 2019-04-29 NOTE — PROGRESS NOTES
New GYN Visit      CC:   Chief Complaint   Patient presents with    New Patient       HPI:  68 y.o. [id number] presents for incidental finding of thickened endometrial stripe on ultrasound. Patient has dementia so a majority of this history was obtained from her . He reports that she was having intermittent left sided abdominal pain which is why her PCP ordered an pelvic ultrasound. She says that her pain has improved recently after being treated for a UTI. Denies any vaginal bleeding, no discharge or itching. States she went through menopause many years ago and has not had any postmenopausal bleeding issues. Denies any bloating or unexpected weight loss. Otherwise no complaints today. Review of Systems:   Review of Systems   Constitutional: Negative for chills, fever and unexpected weight change. Gastrointestinal: Positive for abdominal pain and constipation. Negative for diarrhea, nausea and vomiting. Genitourinary: Negative for difficulty urinating, dysuria, menstrual problem, vaginal bleeding, vaginal discharge and vaginal pain. Musculoskeletal: Negative for back pain.        Obstetric History  OB History    Para Term  AB Living   2 2 2     2   SAB TAB Ectopic Molar Multiple Live Births             2      # Outcome Date GA Lbr Reynaldo/2nd Weight Sex Delivery Anes PTL Lv   2 Term 78 40w0d   F Vag-Spont   AUREA   1 Term 75 40w0d   F Vag-Spont   AUREA       Gynecologic History  Menstrual History:   LMP: Patient unsure   Menstrual pattern: postmenopausal without bleeding  Sexual History:   Contraception: n/a   Denies history of STIs  Pap History:   Last pap smear: patient unsure, reports result as normal   History of abnormal pap smears: denies    Medical History:  Past Medical History:   Diagnosis Date    Arthritis     Dementia     Depression     Eczema     Hyperlipidemia     Hypertension     Osteoarthritis      Surgical History:  Past Surgical History:   Procedure Laterality Date    TONSILLECTOMY       Medications:  Current Outpatient Medications   Medication Sig Dispense Refill    VITAMIN D, CHOLECALCIFEROL, PO Take 1 tablet by mouth daily      amLODIPine (NORVASC) 10 MG tablet Take 1 tablet by mouth daily 90 tablet 1    citalopram (CELEXA) 20 MG tablet TAKE 1 TABLET EVERY DAY 90 tablet 1    lisinopril (PRINIVIL;ZESTRIL) 20 MG tablet Take 1 tablet by mouth daily 90 tablet 1    memantine ER (NAMENDA XR) 7 MG CP24 extended release capsule TAKE 1 CAPSULE EVERY DAY 90 capsule 1    metoprolol (LOPRESSOR) 100 MG tablet TAKE 1 TABLET EVERY DAY 90 tablet 1    Calcium Carb-Cholecalciferol (CALCIUM 1000 + D PO) Take by mouth      Multiple Vitamins-Minerals (THERAPEUTIC MULTIVITAMIN-MINERALS) tablet Take 1 tablet by mouth daily.  aspirin 81 MG tablet Take 81 mg by mouth daily. No current facility-administered medications for this visit.         Allergies:  No Known Allergies    Social History:  Social History     Socioeconomic History    Marital status:      Spouse name: None    Number of children: None    Years of education: None    Highest education level: None   Occupational History    Occupation: retired   Social Needs    Financial resource strain: None    Food insecurity:     Worry: None     Inability: None    Transportation needs:     Medical: None     Non-medical: None   Tobacco Use    Smoking status: Former Smoker     Packs/day: 0.25     Years: 20.00     Pack years: 5.00     Types: Cigarettes     Last attempt to quit: 3/29/2019     Years since quittin.0    Smokeless tobacco: Never Used    Tobacco comment: 0.5 or 2 cigs advised to quit   Substance and Sexual Activity    Alcohol use: No     Alcohol/week: 0.0 oz    Drug use: No    Sexual activity: Yes     Partners: Male   Lifestyle    Physical activity:     Days per week: None     Minutes per session: None    Stress: None   Relationships    Social connections:     Talks on phone: None Gets together: None     Attends Buddhist service: None     Active member of club or organization: None     Attends meetings of clubs or organizations: None     Relationship status: None    Intimate partner violence:     Fear of current or ex partner: None     Emotionally abused: None     Physically abused: None     Forced sexual activity: None   Other Topics Concern    None   Social History Narrative    None       Family History:  Family History   Problem Relation Age of Onset    Diabetes Father     Heart Disease Father      Denies personal/family history of cervical, uterine, ovarian, vulvar, breast, or colon cancers. Objective:  /66 (Site: Right Upper Arm, Position: Sitting, Cuff Size: Medium Adult)   Pulse 59   Temp 97.5 °F (36.4 °C) (Oral)   Ht 4' 11.75\" (1.518 m)   Wt 158 lb 3.2 oz (71.8 kg)   LMP  (LMP Unknown)   Breastfeeding? No   BMI 31.16 kg/m²     Exam:  Physical Exam   Constitutional: She appears well-developed and well-nourished. HENT:   Head: Normocephalic and atraumatic. Cardiovascular: Normal rate. Pulmonary/Chest: Effort normal. No respiratory distress. Abdominal: Soft. She exhibits no distension and no mass. There is no tenderness. There is no rebound and no guarding. Genitourinary:   Genitourinary Comments: Pelvic exam deferred today as patient is asymptomatic at this time   Skin: Skin is warm and dry.      Ultrasound:  Narrative   EXAMINATION:   PELVIC ULTRASOUND, 4/5/2019       TECHNIQUE:   Transabdominal and transvaginal pelvic ultrasound was performed.       COMPARISON:   None       HISTORY:   ORDERING SYSTEM PROVIDED HISTORY: Q abdominal pain   TECHNOLOGIST PROVIDED HISTORY:   Reason for exam:-> Uterine fibroid, abd pain       FINDINGS:       Measurements:       Uterus:  6.4 x 4.1 x 2.9 cm       Endometrial stripe:  8 mm       Right Ovary:  Not measured       Left Ovary:  Not measured           Ultrasound Findings:       Uterus: Uterus is heterogeneous in appearance.  Calcification is noted   peripherally within the fundus measuring approximate ly mm. Fly Eduardo is an 11 x   13 x 14 mm fibroid noted.       Endometrial stripe: 8 mm       Right Ovary: Not visualized       Left Ovary:  Not visualized       Free Fluid: No evidence of free fluid.           Impression   Fibroid uterus.       Ovaries are nonvisualized.       Endometrial stripe measures 8 mm which is abnormal for a postmenopausal   patient.  Endometrial biopsy should be considered.               Assessment/Plan:  68 y.o. [id number] presenting for incidental finding of thickened endomterial lining on TVUS:    1. Endometrial thickening on ultrasound  Patient presents today with  for referral for incidental finding of thickened endometrial lining on TVUS. We discussed ultrasound findings, and that in women who have no postmenopausal bleeding we typically recommend an endometrial biopsy if the endometrial lining measures greater than 11mm rather than 4mm if they are having bleeding. Since she has no bleeding symptoms, it is acceptable to defer biopsy for now given her endometrial stripe is 8mm. The risks and benefits of this were discussed with the patient and her  and they would like to hold off on biopsy unless symptoms develop. Encouraged  to call us if patient has any vaginal bleeding so she can come in for biopsy, otherwise would consider follow-up in 1 year if clinically stable and can consider a repeat ultrasound at that time.     Dispo: 1 year for annual exam, consider repeat US at that time  Cecilia Morrow MD

## 2019-04-29 NOTE — PROGRESS NOTES
Patient does not recall date of the last Pap test but reports the results as normal.   Abnormal pap history? no  Last HPV screen: unknown  Mammogram was normal, October/2018.   Last Dexa Scan: June / 2016 abnormal  Contraceptive method: Post menopausal status  Last colonoscopy was 2012

## 2019-04-29 NOTE — TELEPHONE ENCOUNTER
Pt's  called to follow up on meds per office visit this morning. He said pt was taking Memantine 7 mg 1 qd, no refills (he believes this is the one doctor wants to increase dose) The other med Citalopram 20 mg 1 qd, has 1 refill left on it. They use the [facility] mail service for prescriptions. Please advise. Thank you.

## 2019-04-29 NOTE — PROGRESS NOTES
The patient came today for follow up regarding: Dementia. Since the patient's last visit, she continues to have daily episodic short-term memory loss. She is forgetful and needs assistance and prompt from her family daily. Degree can be moderate to severe. No other triggers or other associated symptoms. Frequency is daily. No recurrent falling, recent syncope or passing out. No head trauma. Family denies any psychosis or hallucination. She stays most of the time at home with her . She does not drive.  is trying to take her for a walk 2- 3 times a week. She is on blood pressure medications. No recent chest pain or headaches. She is now on Namenda XR 7 mg daily and Celexa 20 mg daily. She used to be on Aricept.  is not sure why she quit such medications. No significant insomnia or parasomnia. No history of psychosis or hallucination. No other new symptoms today. Other review of system was unremarkable. Past Medical History:   Diagnosis Date    Arthritis     Depression     Eczema     Hyperlipidemia     Hypertension     Osteoarthritis      Prior to Visit Medications    Medication Sig Taking? Authorizing Provider   amLODIPine (NORVASC) 10 MG tablet Take 1 tablet by mouth daily Yes CHRISTINA Meyer CNP   citalopram (CELEXA) 20 MG tablet TAKE 1 TABLET EVERY DAY Yes CHRISTINA Meyer CNP   lisinopril (PRINIVIL;ZESTRIL) 20 MG tablet Take 1 tablet by mouth daily Yes CHRISTINA Meyer CNP   memantine ER (NAMENDA XR) 7 MG CP24 extended release capsule TAKE 1 CAPSULE EVERY DAY Yes CHRISTINA Meyer CNP   metoprolol (LOPRESSOR) 100 MG tablet TAKE 1 TABLET EVERY DAY Yes CHRISTINA Meyer CNP   Calcium Carb-Cholecalciferol (CALCIUM 1000 + D PO) Take by mouth Yes Historical Provider, MD   Multiple Vitamins-Minerals (THERAPEUTIC MULTIVITAMIN-MINERALS) tablet Take 1 tablet by mouth daily.  Yes Historical Provider, MD   aspirin 81 MG tablet Take 81 mg by mouth daily. Yes Historical Provider, MD     No Known Allergies  Social History     Tobacco Use    Smoking status: Former Smoker     Packs/day: 0.25     Years: 20.00     Pack years: 5.00     Types: Cigarettes     Last attempt to quit: 3/29/2019     Years since quittin.0    Smokeless tobacco: Never Used    Tobacco comment: 0.5 or 2 cigs advised to quit   Substance Use Topics    Alcohol use: No     Alcohol/week: 0.0 oz     Family History   Problem Relation Age of Onset    Diabetes Father     Heart Disease Father      Past Surgical History:   Procedure Laterality Date    TONSILLECTOMY             Exam:   Constitutional:   Vitals:    19 1108   BP: 122/68   Pulse: 65   SpO2: 94%   Weight: 158 lb (71.7 kg)   Height: 5' 2\" (1.575 m)       General appearance:  Normal development and appear in no acute distress. Eye: No icterus. Neck: supple  Cardiovascular:   No lower leg edema with good pulsation. Mental Status:   AAO times two  Poor immediate recall and fund of knowledge  Intact remote memory  Intact language  Good attention and concentration     Cranial Nerves:   II: Visual fields: Full to confrontation and nl [facility]. Pupils: equal, round, reactive to light  III,IV,VI: Extra Ocular Movements are intact. No nystagmus  V: Facial sensation is intact   VII: Facial strength and movements: intact and symmetric  VIII: Hearing: Intact to finger rub bilaterally  IX: Palate elevation is symmetric  XI: Shoulder shrug is intact  XII: Tongue movements are normal  Musculoskeletal: 5/5 in all 4 extremities. Tone: Normal tone. Reflexes: Bilateral biceps 2/4, triceps 2/4, brachial radialis 2/4, knee 2/4 and ankle 2/4. Planters: flexor bilaterally. Coordination: no pronator drift, no dysmetria with FNF. Normal REM. Sensation: normal to all modalities in both arms and legs.   Gait/Posture: steady gait     ROS : A 10-12 system review of constitutional, cardiovascular, respiratory, musculoskeletal, endocrine, hematological, skin, SHEENT, genitourinary, psychiatric and neurologic systems was obtained and updated today which is unremarkable except as mentioned in my HPI      Medical decision making:  I personally reviewed and updated social history, past medical history, medications, allergy, surgical history, and family history as documented in the patient's electronic health records. Labs and/or neuroimaging and other test results reviewed and discussed with the patient. Reviewed notes from other physicians. Provided patient education regarding risk, benefits and treatment options as well as adherence to medication regimen and side effect from these medications. Assessment:   Diagnosis Orders   1. Late onset Alzheimer's disease with behavioral disturbance     2. Essential hypertension     3. Dysthymia     4. Primary insomnia     5. Dyslipidemia       Dementia of Alzheimer disease. Severe. Hypertension  Depression  Insomnia  Hyperlipidemia      Plan:  Increase Namenda XR to 14 mg daily. Side effect was discussed today   will get back to me today regarding her home medications and status of her Aricept. Falling precaution  Continue the same cognitive and behavior therapy at home  She does not drive.    Safety precautions at home  Long discussion regarding outcome for dementia with the patient and family  Improving sleep hygiene and avoid daytime naps  Continue Celexa  Statin  Continue current blood pressure medications  Aspirin for stroke prevention  Follow-up next year

## 2019-04-29 NOTE — TELEPHONE ENCOUNTER
Spoke with patient's spouse (Enio Fátima) who went through patient's medication while I was on the phone, and they do not have any Aricept/Donepezil.  is unaware of who or when that medication was stopped. Do you want to restart the Donepezil?  (5 or 10 mg)? Also; do you still want to increase the Namenda XR from 7 mg to 14 mg? Please advise.

## 2019-04-30 RX ORDER — MEMANTINE HYDROCHLORIDE 14 MG/1
14 CAPSULE, EXTENDED RELEASE ORAL DAILY
Qty: 90 CAPSULE | Refills: 0 | Status: SHIPPED | OUTPATIENT
Start: 2019-04-30

## 2019-04-30 RX ORDER — DONEPEZIL HYDROCHLORIDE 5 MG/1
5 TABLET, FILM COATED ORAL NIGHTLY
Qty: 90 TABLET | Refills: 0 | Status: SHIPPED | OUTPATIENT
Start: 2019-04-30 | End: 2019-06-17 | Stop reason: SDUPTHER

## 2019-04-30 NOTE — TELEPHONE ENCOUNTER
Left detailed message on voicemail for patient/spouse that medication was sent to mail order pharmacy [facility]).

## 2019-05-01 ENCOUNTER — TELEPHONE (OUTPATIENT)
Dept: OBGYN CLINIC | Age: 74
End: 2019-05-01

## 2019-05-01 NOTE — TELEPHONE ENCOUNTER
I would like to get the pap smear records from their office if possible. If we can have him stop in to sign MATHEW next time he is around or if he's able to get the records sent to us that would be great. Thanks.

## 2019-05-03 ENCOUNTER — HOSPITAL ENCOUNTER (OUTPATIENT)
Dept: WOMENS IMAGING | Age: 74
Discharge: HOME | End: 2019-05-03
Payer: MEDICARE

## 2019-05-03 DIAGNOSIS — M81.0 AGE-RELATED OSTEOPOROSIS WITHOUT CURRENT PATHOLOGICAL FRACTURE: ICD-10-CM

## 2019-05-03 PROCEDURE — 77080 DXA BONE DENSITY AXIAL: CPT

## 2019-05-07 ENCOUNTER — TELEPHONE (OUTPATIENT)
Dept: FAMILY MEDICINE CLINIC | Age: 74
End: 2019-05-07

## 2019-05-09 ENCOUNTER — OFFICE VISIT (OUTPATIENT)
Dept: FAMILY MEDICINE CLINIC | Age: 74
End: 2019-05-09
Payer: MEDICARE

## 2019-05-09 VITALS
WEIGHT: 158 LBS | OXYGEN SATURATION: 95 % | DIASTOLIC BLOOD PRESSURE: 70 MMHG | SYSTOLIC BLOOD PRESSURE: 126 MMHG | HEART RATE: 67 BPM | BODY MASS INDEX: 31.12 KG/M2

## 2019-05-09 DIAGNOSIS — R35.0 URINARY FREQUENCY: ICD-10-CM

## 2019-05-09 DIAGNOSIS — D64.9 ANEMIA, UNSPECIFIED TYPE: ICD-10-CM

## 2019-05-09 DIAGNOSIS — K59.00 CONSTIPATION, UNSPECIFIED CONSTIPATION TYPE: ICD-10-CM

## 2019-05-09 DIAGNOSIS — R32 URINARY INCONTINENCE, UNSPECIFIED TYPE: Primary | ICD-10-CM

## 2019-05-09 DIAGNOSIS — J06.9 VIRAL URI: ICD-10-CM

## 2019-05-09 LAB
BASOPHILS ABSOLUTE: 0 K/UL (ref 0–0.2)
BASOPHILS RELATIVE PERCENT: 0.4 %
BILIRUBIN, POC: ABNORMAL
BLOOD URINE, POC: ABNORMAL
CLARITY, POC: CLEAR
COLOR, POC: YELLOW
EOSINOPHILS ABSOLUTE: 0.4 K/UL (ref 0–0.6)
EOSINOPHILS RELATIVE PERCENT: 3.7 %
GLUCOSE URINE, POC: ABNORMAL
HCT VFR BLD CALC: 34.4 % (ref 36–48)
HEMOGLOBIN: 11.3 G/DL (ref 12–16)
IRON SATURATION: 12 % (ref 15–50)
IRON: 39 UG/DL (ref 37–145)
KETONES, POC: ABNORMAL
LEUKOCYTE EST, POC: ABNORMAL
LYMPHOCYTES ABSOLUTE: 2.3 K/UL (ref 1–5.1)
LYMPHOCYTES RELATIVE PERCENT: 21 %
MCH RBC QN AUTO: 29.2 PG (ref 26–34)
MCHC RBC AUTO-ENTMCNC: 32.7 G/DL (ref 31–36)
MCV RBC AUTO: 89.2 FL (ref 80–100)
MONOCYTES ABSOLUTE: 1.4 K/UL (ref 0–1.3)
MONOCYTES RELATIVE PERCENT: 13.1 %
NEUTROPHILS ABSOLUTE: 6.8 K/UL (ref 1.7–7.7)
NEUTROPHILS RELATIVE PERCENT: 61.8 %
NITRITE, POC: ABNORMAL
PDW BLD-RTO: 15 % (ref 12.4–15.4)
PH, POC: 5.5
PLATELET # BLD: 448 K/UL (ref 135–450)
PMV BLD AUTO: 8.2 FL (ref 5–10.5)
PROTEIN, POC: ABNORMAL
RBC # BLD: 3.86 M/UL (ref 4–5.2)
SPECIFIC GRAVITY, POC: 1
TOTAL IRON BINDING CAPACITY: 338 UG/DL (ref 260–445)
UROBILINOGEN, POC: 0.2
WBC # BLD: 11 K/UL (ref 4–11)

## 2019-05-09 PROCEDURE — 3017F COLORECTAL CA SCREEN DOC REV: CPT | Performed by: PHYSICIAN ASSISTANT

## 2019-05-09 PROCEDURE — 81002 URINALYSIS NONAUTO W/O SCOPE: CPT | Performed by: PHYSICIAN ASSISTANT

## 2019-05-09 PROCEDURE — G8399 PT W/DXA RESULTS DOCUMENT: HCPCS | Performed by: PHYSICIAN ASSISTANT

## 2019-05-09 PROCEDURE — 1123F ACP DISCUSS/DSCN MKR DOCD: CPT | Performed by: PHYSICIAN ASSISTANT

## 2019-05-09 PROCEDURE — 1090F PRES/ABSN URINE INCON ASSESS: CPT | Performed by: PHYSICIAN ASSISTANT

## 2019-05-09 PROCEDURE — 0509F URINE INCON PLAN DOCD: CPT | Performed by: PHYSICIAN ASSISTANT

## 2019-05-09 PROCEDURE — 4040F PNEUMOC VAC/ADMIN/RCVD: CPT | Performed by: PHYSICIAN ASSISTANT

## 2019-05-09 PROCEDURE — G8417 CALC BMI ABV UP PARAM F/U: HCPCS | Performed by: PHYSICIAN ASSISTANT

## 2019-05-09 PROCEDURE — G8427 DOCREV CUR MEDS BY ELIG CLIN: HCPCS | Performed by: PHYSICIAN ASSISTANT

## 2019-05-09 PROCEDURE — 1036F TOBACCO NON-USER: CPT | Performed by: PHYSICIAN ASSISTANT

## 2019-05-09 PROCEDURE — 99214 OFFICE O/P EST MOD 30 MIN: CPT | Performed by: PHYSICIAN ASSISTANT

## 2019-05-09 RX ORDER — DOCUSATE SODIUM 100 MG/1
100 CAPSULE, LIQUID FILLED ORAL 2 TIMES DAILY
Qty: 60 CAPSULE | Refills: 0 | Status: SHIPPED | OUTPATIENT
Start: 2019-05-09 | End: 2019-06-08

## 2019-05-09 RX ORDER — POLYETHYLENE GLYCOL 3350 17 G/17G
POWDER, FOR SOLUTION ORAL
Qty: 500 G | Refills: 1 | Status: SHIPPED | OUTPATIENT
Start: 2019-05-09

## 2019-05-09 ASSESSMENT — ENCOUNTER SYMPTOMS
COUGH: 1
WHEEZING: 0
SORE THROAT: 0
SWOLLEN GLANDS: 0
DIARRHEA: 0
VOMITING: 0
SINUS PAIN: 0
NAUSEA: 0
RHINORRHEA: 0

## 2019-05-09 NOTE — PROGRESS NOTES
Philip Skaggs 68 y.o. female    Chief Complaint   Patient presents with    Cough     x's 2-3 days     Polyuria     x's 3 weeks, the past week it's been happening 2-3 times a day       URI    This is a new problem. The current episode started yesterday. The problem has been unchanged. There has been no fever. Associated symptoms include congestion and coughing. Pertinent negatives include no diarrhea, dysuria, headaches, nausea, plugged ear sensation, rhinorrhea, sinus pain, sneezing, sore throat, swollen glands, vomiting or wheezing. She has tried nothing for the symptoms. The treatment provided no relief. Patient having increased episodes of urinary incontinence. Having accidents 2-3 times per day since discharge from the hospitalization. Denies having any dysuria, hematuria, increased frequency or urgency. She is not incontinent of stool, urine only. She has been wearing depends at home.  reports that her stools have been very large, almost clogging the toilet at home. She does not have daily bowel movements and can sometimes go 3-4 days in between movements. There is no abdominal pain.      Current Outpatient Medications:     donepezil (ARICEPT) 5 MG tablet, Take 1 tablet by mouth nightly, Disp: 90 tablet, Rfl: 0    memantine ER (NAMENDA XR) 14 MG CP24 extended release capsule, Take 1 capsule by mouth daily, Disp: 90 capsule, Rfl: 0    VITAMIN D, CHOLECALCIFEROL, PO, Take 1 tablet by mouth daily, Disp: , Rfl:     amLODIPine (NORVASC) 10 MG tablet, Take 1 tablet by mouth daily, Disp: 90 tablet, Rfl: 1    citalopram (CELEXA) 20 MG tablet, TAKE 1 TABLET EVERY DAY, Disp: 90 tablet, Rfl: 1    lisinopril (PRINIVIL;ZESTRIL) 20 MG tablet, Take 1 tablet by mouth daily, Disp: 90 tablet, Rfl: 1    metoprolol (LOPRESSOR) 100 MG tablet, TAKE 1 TABLET EVERY DAY, Disp: 90 tablet, Rfl: 1    Calcium Carb-Cholecalciferol (CALCIUM 1000 + D PO), Take by mouth, Disp: , Rfl:     Multiple Vitamins-Minerals Constipation as a possible contributing factor, will treat    Urinary frequency  -     POCT Urinalysis no Micro: small leuks, bili    Viral URI  -  Discussed supportive care: Flonase, warm salt water gargles, Coricidin Cold medication, and cough drops if needed. -   Call if there is no improvement over the next 10 days or sooner if symptoms change.     Constipation  -  Docusate sodium BID, Miralax every 3 days if there has been no bowel movement

## 2019-05-10 LAB
FERRITIN: 91.7 NG/ML (ref 15–150)
FOLATE: >20 NG/ML (ref 4.78–24.2)
VITAMIN B-12: 1101 PG/ML (ref 211–911)

## 2019-05-12 LAB
ORGANISM: ABNORMAL
URINE CULTURE, ROUTINE: ABNORMAL
URINE CULTURE, ROUTINE: ABNORMAL

## 2019-05-28 ENCOUNTER — TELEPHONE (OUTPATIENT)
Dept: FAMILY MEDICINE CLINIC | Age: 74
End: 2019-05-28

## 2019-06-14 ENCOUNTER — TELEPHONE (OUTPATIENT)
Dept: FAMILY MEDICINE CLINIC | Age: 74
End: 2019-06-14

## 2019-06-14 NOTE — TELEPHONE ENCOUNTER
LM for pt's . Etodolac was d/c by Emily Gregg. SHe recommends tylenol for pain. Simvastatin was cancelled with [employer]. Donepezil is beinging controlled by Dr. Griselda Moris office and refill needs to come from them. Will ask Emily Gregg about calcium and will send in if approved.

## 2019-06-14 NOTE — TELEPHONE ENCOUNTER
Patient got medications from [facility] order, but was missing the Etodolac 400mg and the Donepezil 5mg. Can you send these in? Also she was taken off of the Simvastatin 40mg. 2 months ago, but keeps getting refills of this. Can you cancel this one from [facility] mail order? Nadja De Luna was going to give her some calcium to be taken once a week, and this has never been called in. Can you send that one to [facility]?

## 2019-06-17 ENCOUNTER — TELEPHONE (OUTPATIENT)
Dept: FAMILY MEDICINE CLINIC | Age: 74
End: 2019-06-17

## 2019-06-17 ENCOUNTER — TELEPHONE (OUTPATIENT)
Dept: NEUROLOGY | Age: 74
End: 2019-06-17

## 2019-06-17 ENCOUNTER — OFFICE VISIT (OUTPATIENT)
Dept: FAMILY MEDICINE CLINIC | Age: 74
End: 2019-06-17
Payer: MEDICARE

## 2019-06-17 VITALS
HEART RATE: 70 BPM | BODY MASS INDEX: 32.1 KG/M2 | OXYGEN SATURATION: 97 % | RESPIRATION RATE: 16 BRPM | TEMPERATURE: 97.6 F | SYSTOLIC BLOOD PRESSURE: 118 MMHG | DIASTOLIC BLOOD PRESSURE: 74 MMHG | WEIGHT: 163 LBS

## 2019-06-17 DIAGNOSIS — R32 URINARY AND FECAL INCONTINENCE: ICD-10-CM

## 2019-06-17 DIAGNOSIS — F02.818 LATE ONSET ALZHEIMER'S DISEASE WITH BEHAVIORAL DISTURBANCE (HCC): ICD-10-CM

## 2019-06-17 DIAGNOSIS — G30.1 LATE ONSET ALZHEIMER'S DISEASE WITH BEHAVIORAL DISTURBANCE (HCC): ICD-10-CM

## 2019-06-17 DIAGNOSIS — R35.0 URINARY FREQUENCY: ICD-10-CM

## 2019-06-17 DIAGNOSIS — M81.0 AGE-RELATED OSTEOPOROSIS WITHOUT CURRENT PATHOLOGICAL FRACTURE: ICD-10-CM

## 2019-06-17 DIAGNOSIS — G47.00 INSOMNIA, UNSPECIFIED TYPE: ICD-10-CM

## 2019-06-17 DIAGNOSIS — R15.9 URINARY AND FECAL INCONTINENCE: ICD-10-CM

## 2019-06-17 LAB
BILIRUBIN, POC: NORMAL
BLOOD URINE, POC: NORMAL
CLARITY, POC: CLEAR
COLOR, POC: YELLOW
GLUCOSE URINE, POC: NORMAL
KETONES, POC: NORMAL
LEUKOCYTE EST, POC: NORMAL
NITRITE, POC: NORMAL
PH, POC: 5
PROTEIN, POC: NORMAL
SPECIFIC GRAVITY, POC: 1.01
UROBILINOGEN, POC: 0.2

## 2019-06-17 PROCEDURE — G8399 PT W/DXA RESULTS DOCUMENT: HCPCS | Performed by: NURSE PRACTITIONER

## 2019-06-17 PROCEDURE — 0509F URINE INCON PLAN DOCD: CPT | Performed by: NURSE PRACTITIONER

## 2019-06-17 PROCEDURE — 1123F ACP DISCUSS/DSCN MKR DOCD: CPT | Performed by: NURSE PRACTITIONER

## 2019-06-17 PROCEDURE — G8427 DOCREV CUR MEDS BY ELIG CLIN: HCPCS | Performed by: NURSE PRACTITIONER

## 2019-06-17 PROCEDURE — G8417 CALC BMI ABV UP PARAM F/U: HCPCS | Performed by: NURSE PRACTITIONER

## 2019-06-17 PROCEDURE — 3017F COLORECTAL CA SCREEN DOC REV: CPT | Performed by: NURSE PRACTITIONER

## 2019-06-17 PROCEDURE — 4040F PNEUMOC VAC/ADMIN/RCVD: CPT | Performed by: NURSE PRACTITIONER

## 2019-06-17 PROCEDURE — 1090F PRES/ABSN URINE INCON ASSESS: CPT | Performed by: NURSE PRACTITIONER

## 2019-06-17 PROCEDURE — 99214 OFFICE O/P EST MOD 30 MIN: CPT | Performed by: NURSE PRACTITIONER

## 2019-06-17 PROCEDURE — 81002 URINALYSIS NONAUTO W/O SCOPE: CPT | Performed by: NURSE PRACTITIONER

## 2019-06-17 PROCEDURE — 1036F TOBACCO NON-USER: CPT | Performed by: NURSE PRACTITIONER

## 2019-06-17 RX ORDER — TRAZODONE HYDROCHLORIDE 50 MG/1
TABLET ORAL
Qty: 30 TABLET | Refills: 1 | Status: SHIPPED | OUTPATIENT
Start: 2019-06-17 | End: 2019-08-16 | Stop reason: SDUPTHER

## 2019-06-17 RX ORDER — DONEPEZIL HYDROCHLORIDE 5 MG/1
5 TABLET, FILM COATED ORAL NIGHTLY
Qty: 90 TABLET | Refills: 3 | Status: SHIPPED | OUTPATIENT
Start: 2019-06-17

## 2019-06-17 RX ORDER — ALENDRONATE SODIUM 70 MG/1
70 TABLET ORAL
Qty: 12 TABLET | Refills: 1 | Status: SHIPPED | OUTPATIENT
Start: 2019-06-17

## 2019-06-17 RX ORDER — SIMVASTATIN 40 MG
40 TABLET ORAL NIGHTLY
COMMUNITY

## 2019-06-17 ASSESSMENT — ENCOUNTER SYMPTOMS
BLOOD IN STOOL: 0
RESPIRATORY NEGATIVE: 1
ABDOMINAL PAIN: 0

## 2019-06-17 NOTE — PROGRESS NOTES
and hematuria. Psychiatric/Behavioral: Positive for sleep disturbance. Negative for agitation. Physical Exam   Constitutional: Vital signs are normal. She appears well-developed and well-nourished. She does not appear ill. No distress. Neck: Neck supple. No thyromegaly present. Cardiovascular: Normal rate, regular rhythm and normal heart sounds. Pulmonary/Chest: Effort normal and breath sounds normal.   Abdominal: Soft. Bowel sounds are normal. She exhibits no distension. There is no tenderness. Lymphadenopathy:     She has no cervical adenopathy. Neurological: She is alert. Psychiatric: Cognition and memory are impaired. Nursing note and vitals reviewed. Vitals:    06/17/19 0831   BP: 118/74   Pulse: 70   Resp: 16   Temp: 97.6 °F (36.4 °C)   SpO2: 97%       Assessment    1. Urinary frequency    2. Urinary and fecal incontinence    3. Late onset Alzheimer's disease with behavioral disturbance    4. Insomnia, unspecified type    5. Age-related osteoporosis without current pathological fracture        Plan    Thierry Dawkins was seen today for urinary frequency and incontinence. Diagnoses and all orders for this visit:    Urinary frequency  -     POCT Urinalysis no Micro  -     URINE CULTURE    Urinary and fecal incontinence  Late onset Alzheimer's disease with behavioral disturbance        -     Discussed worsening dementia and association with incontinence issues. Tips from Alzheimers association provided. Discussed to do Miralax 3 times a week, limit fluids after dinner, establish routine to go to bathroom before bedtime, have easy access to bathroom    Insomnia, unspecified type  -     traZODone (DESYREL) 50 MG tablet; Take 1/2 tab by mouth at night, if does not help take 1 tab at night for sleep    Age-related osteoporosis without current pathological fracture  -     alendronate (FOSAMAX) 70 MG tablet;  Take 1 tablet by mouth every 7 days With a glass of water before first food/med/drink,avoid lying down for 30min

## 2019-06-17 NOTE — TELEPHONE ENCOUNTER
Last seen 04.29.19    Last office note states to RTO 1 year/04.2020    Next appointment scheduled for none yet. Medication sent to pharmacy, per ts last office notes.

## 2019-06-17 NOTE — TELEPHONE ENCOUNTER
Xiomara Thomas called said he needs a refill on pt's Donepezil 5 mg tablet, Take 1 po qhs, #90. Use [facility] pharmacy on file. Last appt was 4/29/19. Next appt is not scheduled, due in 4/2020. Please advise. Thank you.

## 2019-06-17 NOTE — TELEPHONE ENCOUNTER
Pharmacy called to clarify how long pt should take 1/2 tab a night of the trazodone before increasing to 1.

## 2019-06-20 LAB
ORGANISM: ABNORMAL
URINE CULTURE, ROUTINE: ABNORMAL
URINE CULTURE, ROUTINE: ABNORMAL

## 2019-06-20 RX ORDER — NITROFURANTOIN 25; 75 MG/1; MG/1
100 CAPSULE ORAL 2 TIMES DAILY
Qty: 10 CAPSULE | Refills: 0 | Status: SHIPPED | OUTPATIENT
Start: 2019-06-20 | End: 2019-06-25

## 2019-08-16 ENCOUNTER — TELEPHONE (OUTPATIENT)
Dept: FAMILY MEDICINE CLINIC | Age: 74
End: 2019-08-16

## 2019-08-16 ENCOUNTER — HOSPITAL ENCOUNTER (OUTPATIENT)
Dept: GENERAL RADIOLOGY | Age: 74
Discharge: HOME | End: 2019-08-16
Payer: MEDICARE

## 2019-08-16 ENCOUNTER — HOSPITAL ENCOUNTER (OUTPATIENT)
Age: 74
Discharge: HOME | End: 2019-08-16
Payer: MEDICARE

## 2019-08-16 ENCOUNTER — OFFICE VISIT (OUTPATIENT)
Dept: FAMILY MEDICINE CLINIC | Age: 74
End: 2019-08-16
Payer: MEDICARE

## 2019-08-16 VITALS
WEIGHT: 166.4 LBS | SYSTOLIC BLOOD PRESSURE: 120 MMHG | OXYGEN SATURATION: 97 % | HEIGHT: 62 IN | DIASTOLIC BLOOD PRESSURE: 78 MMHG | BODY MASS INDEX: 30.62 KG/M2 | HEART RATE: 64 BPM

## 2019-08-16 DIAGNOSIS — R53.83 FATIGUE, UNSPECIFIED TYPE: ICD-10-CM

## 2019-08-16 DIAGNOSIS — R06.02 SOB (SHORTNESS OF BREATH) ON EXERTION: ICD-10-CM

## 2019-08-16 DIAGNOSIS — R40.0 DROWSINESS: ICD-10-CM

## 2019-08-16 DIAGNOSIS — I10 ESSENTIAL HYPERTENSION: ICD-10-CM

## 2019-08-16 DIAGNOSIS — F34.1 DYSTHYMIA: ICD-10-CM

## 2019-08-16 DIAGNOSIS — G47.00 INSOMNIA, UNSPECIFIED TYPE: ICD-10-CM

## 2019-08-16 DIAGNOSIS — R91.8 HILAR MASS: ICD-10-CM

## 2019-08-16 DIAGNOSIS — D64.9 ANEMIA, UNSPECIFIED TYPE: ICD-10-CM

## 2019-08-16 LAB
BASOPHILS ABSOLUTE: 0 K/UL (ref 0–0.2)
BASOPHILS RELATIVE PERCENT: 0.5 %
BILIRUBIN, POC: ABNORMAL
BLOOD URINE, POC: ABNORMAL
CLARITY, POC: CLEAR
COLOR, POC: YELLOW
EOSINOPHILS ABSOLUTE: 0.2 K/UL (ref 0–0.6)
EOSINOPHILS RELATIVE PERCENT: 2.6 %
GLUCOSE URINE, POC: ABNORMAL
HCT VFR BLD CALC: 36.1 % (ref 36–48)
HEMOGLOBIN: 11.8 G/DL (ref 12–16)
IRON SATURATION: 16 % (ref 15–50)
IRON: 54 UG/DL (ref 37–145)
KETONES, POC: ABNORMAL
LEUKOCYTE EST, POC: ABNORMAL
LYMPHOCYTES ABSOLUTE: 1.6 K/UL (ref 1–5.1)
LYMPHOCYTES RELATIVE PERCENT: 27.8 %
MCH RBC QN AUTO: 29.4 PG (ref 26–34)
MCHC RBC AUTO-ENTMCNC: 32.7 G/DL (ref 31–36)
MCV RBC AUTO: 89.9 FL (ref 80–100)
MONOCYTES ABSOLUTE: 0.7 K/UL (ref 0–1.3)
MONOCYTES RELATIVE PERCENT: 12.1 %
NEUTROPHILS ABSOLUTE: 3.4 K/UL (ref 1.7–7.7)
NEUTROPHILS RELATIVE PERCENT: 57 %
NITRITE, POC: ABNORMAL
PDW BLD-RTO: 15.1 % (ref 12.4–15.4)
PH, POC: 6
PLATELET # BLD: 367 K/UL (ref 135–450)
PMV BLD AUTO: 8.4 FL (ref 5–10.5)
PROTEIN, POC: ABNORMAL
RBC # BLD: 4.01 M/UL (ref 4–5.2)
SPECIFIC GRAVITY, POC: 1.01
TOTAL IRON BINDING CAPACITY: 348 UG/DL (ref 260–445)
UROBILINOGEN, POC: 0.2
WBC # BLD: 5.9 K/UL (ref 4–11)

## 2019-08-16 PROCEDURE — G8427 DOCREV CUR MEDS BY ELIG CLIN: HCPCS | Performed by: NURSE PRACTITIONER

## 2019-08-16 PROCEDURE — G8399 PT W/DXA RESULTS DOCUMENT: HCPCS | Performed by: NURSE PRACTITIONER

## 2019-08-16 PROCEDURE — 81002 URINALYSIS NONAUTO W/O SCOPE: CPT | Performed by: NURSE PRACTITIONER

## 2019-08-16 PROCEDURE — 1123F ACP DISCUSS/DSCN MKR DOCD: CPT | Performed by: NURSE PRACTITIONER

## 2019-08-16 PROCEDURE — 99214 OFFICE O/P EST MOD 30 MIN: CPT | Performed by: NURSE PRACTITIONER

## 2019-08-16 PROCEDURE — 3017F COLORECTAL CA SCREEN DOC REV: CPT | Performed by: NURSE PRACTITIONER

## 2019-08-16 PROCEDURE — 1036F TOBACCO NON-USER: CPT | Performed by: NURSE PRACTITIONER

## 2019-08-16 PROCEDURE — 71046 X-RAY EXAM CHEST 2 VIEWS: CPT

## 2019-08-16 PROCEDURE — 4040F PNEUMOC VAC/ADMIN/RCVD: CPT | Performed by: NURSE PRACTITIONER

## 2019-08-16 PROCEDURE — G8417 CALC BMI ABV UP PARAM F/U: HCPCS | Performed by: NURSE PRACTITIONER

## 2019-08-16 PROCEDURE — 1090F PRES/ABSN URINE INCON ASSESS: CPT | Performed by: NURSE PRACTITIONER

## 2019-08-16 RX ORDER — AZITHROMYCIN 250 MG/1
TABLET, FILM COATED ORAL
Qty: 1 PACKET | Refills: 0 | Status: ON HOLD | OUTPATIENT
Start: 2019-08-16 | End: 2019-08-28 | Stop reason: HOSPADM

## 2019-08-16 RX ORDER — METOPROLOL TARTRATE 100 MG/1
TABLET ORAL
Qty: 90 TABLET | Refills: 1 | Status: SHIPPED | OUTPATIENT
Start: 2019-08-16

## 2019-08-16 RX ORDER — LISINOPRIL 20 MG/1
20 TABLET ORAL DAILY
Qty: 90 TABLET | Refills: 1 | Status: SHIPPED | OUTPATIENT
Start: 2019-08-16

## 2019-08-16 RX ORDER — CITALOPRAM 20 MG/1
TABLET ORAL
Qty: 90 TABLET | Refills: 1 | Status: SHIPPED | OUTPATIENT
Start: 2019-08-16

## 2019-08-16 RX ORDER — TRAZODONE HYDROCHLORIDE 50 MG/1
TABLET ORAL
Qty: 90 TABLET | Refills: 1 | Status: SHIPPED | OUTPATIENT
Start: 2019-08-16

## 2019-08-16 RX ORDER — AMLODIPINE BESYLATE 10 MG/1
10 TABLET ORAL DAILY
Qty: 90 TABLET | Refills: 1 | Status: SHIPPED | OUTPATIENT
Start: 2019-08-16

## 2019-08-17 LAB
FERRITIN: 92.3 NG/ML (ref 15–150)
PRO-BNP: 228 PG/ML (ref 0–124)

## 2019-08-18 DIAGNOSIS — R91.8 HILAR MASS: Primary | ICD-10-CM

## 2019-08-19 ENCOUNTER — TELEPHONE (OUTPATIENT)
Dept: FAMILY MEDICINE CLINIC | Age: 74
End: 2019-08-19

## 2019-08-19 ASSESSMENT — ENCOUNTER SYMPTOMS
BLOOD IN STOOL: 0
CHEST TIGHTNESS: 0
WHEEZING: 0
ABDOMINAL PAIN: 0
COUGH: 0
SHORTNESS OF BREATH: 1
CONSTIPATION: 0

## 2019-08-19 NOTE — TELEPHONE ENCOUNTER
Pt.  is asking that Brandy cancel the echo cardiogram for his wife at [facility] on 8-.

## 2019-08-20 ENCOUNTER — TELEPHONE (OUTPATIENT)
Dept: FAMILY MEDICINE CLINIC | Age: 74
End: 2019-08-20

## 2019-08-20 DIAGNOSIS — R91.8 HILAR MASS: ICD-10-CM

## 2019-08-20 LAB — URINE CULTURE, ROUTINE: NORMAL

## 2019-08-21 ENCOUNTER — TELEPHONE (OUTPATIENT)
Dept: FAMILY MEDICINE CLINIC | Age: 74
End: 2019-08-21

## 2019-08-21 ENCOUNTER — NURSE TRIAGE (OUTPATIENT)
Dept: OTHER | Facility: CLINIC | Age: 74
End: 2019-08-21

## 2019-08-21 ENCOUNTER — APPOINTMENT (OUTPATIENT)
Dept: CT IMAGING | Age: 74
End: 2019-08-21
Payer: MEDICARE

## 2019-08-21 ENCOUNTER — TELEPHONE (OUTPATIENT)
Dept: NEUROLOGY | Age: 74
End: 2019-08-21

## 2019-08-21 ENCOUNTER — HOSPITAL ENCOUNTER (EMERGENCY)
Age: 74
Discharge: ANOTHER HOSPITAL | End: 2019-08-21
Attending: EMERGENCY MEDICINE
Payer: MEDICARE

## 2019-08-21 ENCOUNTER — HOSPITAL ENCOUNTER (INPATIENT)
Age: 74
LOS: 7 days | Discharge: HOME HEALTH | DRG: 981 | End: 2019-08-28
Attending: INTERNAL MEDICINE | Admitting: INTERNAL MEDICINE
Payer: MEDICARE

## 2019-08-21 VITALS
WEIGHT: 166.4 LBS | OXYGEN SATURATION: 98 % | SYSTOLIC BLOOD PRESSURE: 139 MMHG | TEMPERATURE: 98 F | DIASTOLIC BLOOD PRESSURE: 75 MMHG | HEIGHT: 61 IN | HEART RATE: 63 BPM | BODY MASS INDEX: 31.42 KG/M2 | RESPIRATION RATE: 14 BRPM

## 2019-08-21 DIAGNOSIS — R53.1 PROGRESSIVE FOCAL MOTOR WEAKNESS: ICD-10-CM

## 2019-08-21 DIAGNOSIS — G93.89 BRAIN MASS: Primary | ICD-10-CM

## 2019-08-21 DIAGNOSIS — R91.8 LUNG MASS: ICD-10-CM

## 2019-08-21 DIAGNOSIS — F03.90 DEMENTIA WITHOUT BEHAVIORAL DISTURBANCE, UNSPECIFIED DEMENTIA TYPE: ICD-10-CM

## 2019-08-21 PROBLEM — C78.00 METASTATIC LUNG CARCINOMA, UNSPECIFIED LATERALITY (HCC): Status: ACTIVE | Noted: 2019-08-21

## 2019-08-21 LAB
A/G RATIO: 1 (ref 1.1–2.2)
ALBUMIN SERPL-MCNC: 3.7 G/DL (ref 3.4–5)
ALP BLD-CCNC: 50 U/L (ref 40–129)
ALT SERPL-CCNC: 32 U/L (ref 10–40)
ANION GAP SERPL CALCULATED.3IONS-SCNC: 11 MMOL/L (ref 3–16)
ANION GAP SERPL CALCULATED.3IONS-SCNC: 12 MMOL/L (ref 3–16)
AST SERPL-CCNC: 35 U/L (ref 15–37)
BASOPHILS ABSOLUTE: 0 K/UL (ref 0–0.2)
BASOPHILS RELATIVE PERCENT: 0.7 %
BILIRUB SERPL-MCNC: 0.4 MG/DL (ref 0–1)
BILIRUBIN URINE: ABNORMAL
BLOOD, URINE: NEGATIVE
BUN BLDV-MCNC: 19 MG/DL (ref 7–20)
BUN BLDV-MCNC: 19 MG/DL (ref 7–20)
CALCIUM SERPL-MCNC: 9 MG/DL (ref 8.3–10.6)
CALCIUM SERPL-MCNC: 9.8 MG/DL (ref 8.3–10.6)
CHLORIDE BLD-SCNC: 93 MMOL/L (ref 99–110)
CHLORIDE BLD-SCNC: 94 MMOL/L (ref 99–110)
CLARITY: CLEAR
CO2: 26 MMOL/L (ref 21–32)
CO2: 26 MMOL/L (ref 21–32)
COLOR: ABNORMAL
CREAT SERPL-MCNC: 1 MG/DL (ref 0.6–1.2)
CREAT SERPL-MCNC: 1.1 MG/DL (ref 0.6–1.2)
EKG ATRIAL RATE: 63 BPM
EKG DIAGNOSIS: NORMAL
EKG P AXIS: 64 DEGREES
EKG P-R INTERVAL: 186 MS
EKG Q-T INTERVAL: 428 MS
EKG QRS DURATION: 84 MS
EKG QTC CALCULATION (BAZETT): 437 MS
EKG R AXIS: -16 DEGREES
EKG T AXIS: 85 DEGREES
EKG VENTRICULAR RATE: 63 BPM
EOSINOPHILS ABSOLUTE: 0.2 K/UL (ref 0–0.6)
EOSINOPHILS RELATIVE PERCENT: 3.4 %
GFR AFRICAN AMERICAN: 59
GFR AFRICAN AMERICAN: >60
GFR NON-AFRICAN AMERICAN: 49
GFR NON-AFRICAN AMERICAN: 54
GLOBULIN: 3.6 G/DL
GLUCOSE BLD-MCNC: 100 MG/DL (ref 70–99)
GLUCOSE BLD-MCNC: 119 MG/DL (ref 70–99)
GLUCOSE URINE: NEGATIVE MG/DL
HCT VFR BLD CALC: 33.8 % (ref 36–48)
HEMOGLOBIN: 11.5 G/DL (ref 12–16)
KETONES, URINE: NEGATIVE MG/DL
LEUKOCYTE ESTERASE, URINE: NEGATIVE
LYMPHOCYTES ABSOLUTE: 1.6 K/UL (ref 1–5.1)
LYMPHOCYTES RELATIVE PERCENT: 25.9 %
MCH RBC QN AUTO: 29.7 PG (ref 26–34)
MCHC RBC AUTO-ENTMCNC: 34 G/DL (ref 31–36)
MCV RBC AUTO: 87.5 FL (ref 80–100)
MICROSCOPIC EXAMINATION: ABNORMAL
MONOCYTES ABSOLUTE: 0.8 K/UL (ref 0–1.3)
MONOCYTES RELATIVE PERCENT: 13.5 %
NEUTROPHILS ABSOLUTE: 3.4 K/UL (ref 1.7–7.7)
NEUTROPHILS RELATIVE PERCENT: 56.5 %
NITRITE, URINE: NEGATIVE
PDW BLD-RTO: 14.4 % (ref 12.4–15.4)
PH UA: 7 (ref 5–8)
PLATELET # BLD: 311 K/UL (ref 135–450)
PMV BLD AUTO: 7.2 FL (ref 5–10.5)
POTASSIUM SERPL-SCNC: 4.2 MMOL/L (ref 3.5–5.1)
POTASSIUM SERPL-SCNC: 4.6 MMOL/L (ref 3.5–5.1)
PROTEIN UA: NEGATIVE MG/DL
RBC # BLD: 3.86 M/UL (ref 4–5.2)
SODIUM BLD-SCNC: 130 MMOL/L (ref 136–145)
SODIUM BLD-SCNC: 132 MMOL/L (ref 136–145)
SPECIFIC GRAVITY UA: <=1.005 (ref 1–1.03)
TOTAL PROTEIN: 7.3 G/DL (ref 6.4–8.2)
TROPONIN: <0.01 NG/ML
URINE REFLEX TO CULTURE: ABNORMAL
URINE TYPE: ABNORMAL
UROBILINOGEN, URINE: 0.2 E.U./DL
WBC # BLD: 6.1 K/UL (ref 4–11)

## 2019-08-21 PROCEDURE — 96365 THER/PROPH/DIAG IV INF INIT: CPT

## 2019-08-21 PROCEDURE — 80053 COMPREHEN METABOLIC PANEL: CPT

## 2019-08-21 PROCEDURE — 2580000003 HC RX 258: Performed by: SURGERY

## 2019-08-21 PROCEDURE — 70450 CT HEAD/BRAIN W/O DYE: CPT

## 2019-08-21 PROCEDURE — 96375 TX/PRO/DX INJ NEW DRUG ADDON: CPT

## 2019-08-21 PROCEDURE — 6360000004 HC RX CONTRAST MEDICATION: Performed by: NURSE PRACTITIONER

## 2019-08-21 PROCEDURE — 6360000002 HC RX W HCPCS: Performed by: NURSE PRACTITIONER

## 2019-08-21 PROCEDURE — 2580000003 HC RX 258: Performed by: NURSE PRACTITIONER

## 2019-08-21 PROCEDURE — 85025 COMPLETE CBC W/AUTO DIFF WBC: CPT

## 2019-08-21 PROCEDURE — 81003 URINALYSIS AUTO W/O SCOPE: CPT

## 2019-08-21 PROCEDURE — 2000000000 HC ICU R&B

## 2019-08-21 PROCEDURE — 71260 CT THORAX DX C+: CPT

## 2019-08-21 PROCEDURE — 84484 ASSAY OF TROPONIN QUANT: CPT

## 2019-08-21 PROCEDURE — 99285 EMERGENCY DEPT VISIT HI MDM: CPT

## 2019-08-21 PROCEDURE — 93010 ELECTROCARDIOGRAM REPORT: CPT | Performed by: INTERNAL MEDICINE

## 2019-08-21 PROCEDURE — 93005 ELECTROCARDIOGRAM TRACING: CPT | Performed by: EMERGENCY MEDICINE

## 2019-08-21 RX ORDER — ONDANSETRON 2 MG/ML
4 INJECTION INTRAMUSCULAR; INTRAVENOUS EVERY 6 HOURS PRN
Status: DISCONTINUED | OUTPATIENT
Start: 2019-08-21 | End: 2019-08-28 | Stop reason: HOSPADM

## 2019-08-21 RX ORDER — LEVETIRACETAM 500 MG/1
500 TABLET ORAL 2 TIMES DAILY
Status: DISCONTINUED | OUTPATIENT
Start: 2019-08-22 | End: 2019-08-28 | Stop reason: HOSPADM

## 2019-08-21 RX ORDER — CITALOPRAM 20 MG/1
20 TABLET ORAL DAILY
Status: DISCONTINUED | OUTPATIENT
Start: 2019-08-22 | End: 2019-08-28 | Stop reason: HOSPADM

## 2019-08-21 RX ORDER — DONEPEZIL HYDROCHLORIDE 10 MG/1
5 TABLET, FILM COATED ORAL NIGHTLY
Status: DISCONTINUED | OUTPATIENT
Start: 2019-08-21 | End: 2019-08-21

## 2019-08-21 RX ORDER — METOPROLOL TARTRATE 100 MG/1
100 TABLET ORAL DAILY
Status: DISCONTINUED | OUTPATIENT
Start: 2019-08-22 | End: 2019-08-28 | Stop reason: HOSPADM

## 2019-08-21 RX ORDER — SODIUM CHLORIDE 9 MG/ML
INJECTION, SOLUTION INTRAVENOUS CONTINUOUS
Status: DISCONTINUED | OUTPATIENT
Start: 2019-08-21 | End: 2019-08-26

## 2019-08-21 RX ORDER — DEXAMETHASONE 4 MG/1
4 TABLET ORAL EVERY 12 HOURS SCHEDULED
Status: DISCONTINUED | OUTPATIENT
Start: 2019-08-22 | End: 2019-08-22

## 2019-08-21 RX ORDER — SIMVASTATIN 40 MG
40 TABLET ORAL NIGHTLY
Status: DISCONTINUED | OUTPATIENT
Start: 2019-08-21 | End: 2019-08-21

## 2019-08-21 RX ORDER — SODIUM CHLORIDE 0.9 % (FLUSH) 0.9 %
10 SYRINGE (ML) INJECTION EVERY 12 HOURS SCHEDULED
Status: DISCONTINUED | OUTPATIENT
Start: 2019-08-21 | End: 2019-08-28 | Stop reason: HOSPADM

## 2019-08-21 RX ORDER — MEMANTINE HYDROCHLORIDE 5 MG/1
5 TABLET ORAL 2 TIMES DAILY
Status: DISCONTINUED | OUTPATIENT
Start: 2019-08-21 | End: 2019-08-21

## 2019-08-21 RX ORDER — LISINOPRIL 20 MG/1
20 TABLET ORAL DAILY
Status: DISCONTINUED | OUTPATIENT
Start: 2019-08-22 | End: 2019-08-26

## 2019-08-21 RX ORDER — DEXAMETHASONE SODIUM PHOSPHATE 4 MG/ML
10 INJECTION, SOLUTION INTRA-ARTICULAR; INTRALESIONAL; INTRAMUSCULAR; INTRAVENOUS; SOFT TISSUE ONCE
Status: COMPLETED | OUTPATIENT
Start: 2019-08-21 | End: 2019-08-21

## 2019-08-21 RX ORDER — POLYETHYLENE GLYCOL 3350 17 G/17G
17 POWDER, FOR SOLUTION ORAL DAILY PRN
Status: DISCONTINUED | OUTPATIENT
Start: 2019-08-21 | End: 2019-08-28 | Stop reason: HOSPADM

## 2019-08-21 RX ORDER — SIMVASTATIN 40 MG
40 TABLET ORAL NIGHTLY
Status: DISCONTINUED | OUTPATIENT
Start: 2019-08-22 | End: 2019-08-28 | Stop reason: HOSPADM

## 2019-08-21 RX ORDER — AMLODIPINE BESYLATE 10 MG/1
10 TABLET ORAL DAILY
Status: DISCONTINUED | OUTPATIENT
Start: 2019-08-22 | End: 2019-08-28 | Stop reason: HOSPADM

## 2019-08-21 RX ORDER — ALENDRONATE SODIUM 70 MG/1
70 TABLET ORAL
Status: DISCONTINUED | OUTPATIENT
Start: 2019-08-21 | End: 2019-08-21

## 2019-08-21 RX ORDER — DEXAMETHASONE 4 MG/1
4 TABLET ORAL EVERY 12 HOURS SCHEDULED
Status: DISCONTINUED | OUTPATIENT
Start: 2019-08-21 | End: 2019-08-21

## 2019-08-21 RX ORDER — ACETAMINOPHEN 325 MG/1
650 TABLET ORAL EVERY 4 HOURS PRN
Status: DISCONTINUED | OUTPATIENT
Start: 2019-08-21 | End: 2019-08-28 | Stop reason: HOSPADM

## 2019-08-21 RX ORDER — DONEPEZIL HYDROCHLORIDE 10 MG/1
5 TABLET, FILM COATED ORAL NIGHTLY
Status: DISCONTINUED | OUTPATIENT
Start: 2019-08-22 | End: 2019-08-28 | Stop reason: HOSPADM

## 2019-08-21 RX ORDER — PANTOPRAZOLE SODIUM 40 MG/10ML
40 INJECTION, POWDER, LYOPHILIZED, FOR SOLUTION INTRAVENOUS DAILY
Status: DISCONTINUED | OUTPATIENT
Start: 2019-08-22 | End: 2019-08-22

## 2019-08-21 RX ORDER — MEMANTINE HYDROCHLORIDE 5 MG/1
5 TABLET ORAL 2 TIMES DAILY
Status: DISCONTINUED | OUTPATIENT
Start: 2019-08-22 | End: 2019-08-28 | Stop reason: HOSPADM

## 2019-08-21 RX ORDER — SODIUM CHLORIDE 0.9 % (FLUSH) 0.9 %
10 SYRINGE (ML) INJECTION PRN
Status: DISCONTINUED | OUTPATIENT
Start: 2019-08-21 | End: 2019-08-28 | Stop reason: HOSPADM

## 2019-08-21 RX ORDER — TRAZODONE HYDROCHLORIDE 50 MG/1
25 TABLET ORAL NIGHTLY
Status: DISCONTINUED | OUTPATIENT
Start: 2019-08-22 | End: 2019-08-28 | Stop reason: HOSPADM

## 2019-08-21 RX ADMIN — DEXAMETHASONE SODIUM PHOSPHATE 10 MG: 4 INJECTION, SOLUTION INTRAMUSCULAR; INTRAVENOUS at 17:59

## 2019-08-21 RX ADMIN — SODIUM CHLORIDE: 9 INJECTION, SOLUTION INTRAVENOUS at 23:16

## 2019-08-21 RX ADMIN — IOPAMIDOL 75 ML: 755 INJECTION, SOLUTION INTRAVENOUS at 14:17

## 2019-08-21 RX ADMIN — LEVETIRACETAM 500 MG: 100 INJECTION, SOLUTION INTRAVENOUS at 17:59

## 2019-08-21 RX ADMIN — Medication 10 ML: at 23:52

## 2019-08-21 NOTE — ED PROVIDER NOTES
[facility]  ED    CHIEF COMPLAINT  Extremity Weakness ( reporting over the past two days he's noticed patient has had difficulity holding things with her left hand. (pt has dementia) he reports \"it takes everything out of her just to get dressed\" patient's  reporting patient has baseline confusion and says its often times difficult to tell if her actions are from dementia or something else .) and Fatigue    HISTORY OF PRESENT ILLNESS  Dafne Bonilla is a 69 y.o. female who presents to the ED complaining of left arm weakness. Last couple days she has been weak, having to do most of the work. Left arm weakness first noted Monday as she was having trouble holding a fork. Tuesday about the same. This morning more trouble getting dressed, she was holding her fork in the left arm abnormally and it was like she couldn't lift it. She could do the task fine with her right arm. No noted speech slurring or facial droop. Saw PCP last week, had to get a CXR, started a new prescription-thinks maybe antibiotic. The patient is currently rating their pain as 0/10. No treatments have been tried prior to arrival in the ED. The patient arrived to the ED via private car and is accompanied by misael who is bedside for the visit. Nursing notes reviewed.    Past Medical History:   Diagnosis Date    Arthritis     Dementia     Depression     Eczema     Hyperlipidemia     Hypertension     Metastatic lung carcinoma, unspecified laterality ([facility] [address] 8/21/2019    Osteoarthritis     Osteoporosis      Past Surgical History:   Procedure Laterality Date    TONSILLECTOMY       Family History   Problem Relation Age of Onset    Diabetes Father     Heart Disease Father      Social History     Socioeconomic History    Marital status:      Spouse name: Not on file    Number of children: Not on file    Years of education: Not on file    Highest education level: Not on file   Occupational is symmetrical, tongue is midline. Uvula is midline and elevates appropriately. Posterior oropharynx is without erythema, edema, or exudate. EYES: Sclera is non-icteric. Conjunctiva are pink. Caffie Siobhan, PERRL. No nystagmus. ENT: External ears are normal. Mucous membranes are moist. Tongue in the midline. Pharynx without erythema or exudates. No uvular deviation. NECK: Supple. Normal ROM. Non-tender. Trachea mid-line. No stridor or meningismus. Cardiac: Regular rate and rhythm. Capillary refill is brisk in all extremities. S1/S2 is normal. There are no murmurs, rubs, or gallops to auscultation. Strong and equal pulses in the upper and lower extremities. No edema or cyanosis. LUNGS: Breathing is unlabored. Speaking comfortably in full sentences. Equal and symmetric chest rise. Breath sounds are clear throughout. There is no wheezing, rales, or rhonchi to auscultation. Abdomen: Non-distended. Non-tender to palpation. Bowel sounds are positive in all 4 quadrants. There is no hepatosplenomegaly to palpation. No palpable pulsatile masses. Musculoskeletal: Left shoulder shrug weaker than right, left arm pronator drift. Left arm weakness 3-4/5 when compared to right 5/5. Left leg weakness 4/5 when compared to right 5/5. Normal ROM. Moving all extremities equally and appropriately. Upper and lower extremities have no deformities and they are non-tender to palpation. The calves are nontender to palpation. Active range of motion. NEUROLOGICAL: Awake, alert and oriented to name only. Sensation is intact to light touch in the upper and lower extremities. SKIN: Warm and dry. Skin is with good color. Skin is intact. No petechiae, rashes, or ecchymoses. IMMUNOLOGICAL: No palpable lymphadenopathy or lymphatic streaking. Psychiatric: Mood and affect appropriate. Speech is clear and articulate.     LABS  Results for orders placed or performed during the hospital encounter of 08/21/19   CBC auto differential   Result Value Ref Range    WBC 6.1 4.0 - 11.0 K/uL    RBC 3.86 (L) 4.00 - 5.20 M/uL    Hemoglobin 11.5 (L) 12.0 - 16.0 g/dL    Hematocrit 33.8 (L) 36.0 - 48.0 %    MCV 87.5 80.0 - 100.0 fL    MCH 29.7 26.0 - 34.0 pg    MCHC 34.0 31.0 - 36.0 g/dL    RDW 14.4 12.4 - 15.4 %    Platelets 818 787 - 703 K/uL    MPV 7.2 5.0 - 10.5 fL    Neutrophils % 56.5 %    Lymphocytes % 25.9 %    Monocytes % 13.5 %    Eosinophils % 3.4 %    Basophils % 0.7 %    Neutrophils # 3.4 1.7 - 7.7 K/uL    Lymphocytes # 1.6 1.0 - 5.1 K/uL    Monocytes # 0.8 0.0 - 1.3 K/uL    Eosinophils # 0.2 0.0 - 0.6 K/uL    Basophils # 0.0 0.0 - 0.2 K/uL   Comprehensive metabolic panel   Result Value Ref Range    Sodium 130 (L) 136 - 145 mmol/L    Potassium 4.2 3.5 - 5.1 mmol/L    Chloride 93 (L) 99 - 110 mmol/L    CO2 26 21 - 32 mmol/L    Anion Gap 11 3 - 16    Glucose 119 (H) 70 - 99 mg/dL    BUN 19 7 - 20 mg/dL    CREATININE 1.0 0.6 - 1.2 mg/dL    GFR Non-African American 54 (A) >60    GFR African American >60 >60    Calcium 9.0 8.3 - 10.6 mg/dL    Total Protein 7.3 6.4 - 8.2 g/dL    Alb 3.7 3.4 - 5.0 g/dL    Albumin/Globulin Ratio 1.0 (L) 1.1 - 2.2    Total Bilirubin 0.4 0.0 - 1.0 mg/dL    Alkaline Phosphatase 50 40 - 129 U/L    ALT 32 10 - 40 U/L    AST 35 15 - 37 U/L    Globulin 3.6 g/dL   Urinalysis Reflex to Culture   Result Value Ref Range    Color, UA Straw Straw/Yellow    Clarity, UA Clear Clear    Glucose, Ur Negative Negative mg/dL    Bilirubin Urine MODERATE (A) Negative    Ketones, Urine Negative Negative mg/dL    Specific Gravity, UA <=1.005 1.005 - 1.030    Blood, Urine Negative Negative    pH, UA 7.0 5.0 - 8.0    Protein, UA Negative Negative mg/dL    Urobilinogen, Urine 0.2 <2.0 E.U./dL    Nitrite, Urine Negative Negative    Leukocyte Esterase, Urine Negative Negative    Microscopic Examination Not Indicated     Urine Reflex to Culture Not Indicated     Urine Type Not Specified    Troponin   Result Value Ref Range    Troponin <0.01 <0.01 ng/mL   EKG 12 Lead days Acuity: Acute Type of Exam: Initial FINDINGS: BRAIN/VENTRICLES: At the left frontotemporal junction there is an intra-axial, juxtacortical rounded hyperdensity measuring 8 x 10 mm. There is surrounding hypodensity, likely vasogenic edema. Additional areas of hypodensity are noted in the cerebral white matter, with likely volume expansion, for instance in the right frontal lobe and right parietal regions. Right parietal lesion may also have a hemorrhagic focus. There is hypodensity in the deep cerebellar nuclei, greater on the left, also with mild mass effect on the 4th ventricle. There is no hydrocephalus. No herniation is identified. ORBITS: The visualized portion of the orbits demonstrate no acute abnormality. SINUSES: The visualized paranasal sinuses and mastoid air cells demonstrate no acute abnormality. SOFT TISSUES/SKULL:  No acute abnormality of the visualized skull or soft tissues. Pattern suspicious for multiple supratentorial and infratentorial masses, some hemorrhagic. Lung primary is suspected. Critical results were called by Dr. Rigo Pang MD to [address] [place] [address] on 8/21/2019 at 14:28. RECOMMENDATIONS: Follow-up MRI with gadolinium would evaluate further. Ct Chest W Contrast    Result Date: 8/21/2019  EXAMINATION: CT OF THE CHEST WITH CONTRAST 8/21/2019 2:14 pm TECHNIQUE: CT of the chest was performed with the administration of intravenous contrast. Multiplanar reformatted images are provided for review. Dose modulation, iterative reconstruction, and/or weight based adjustment of the mA/kV was utilized to reduce the radiation dose to as low as reasonably achievable. COMPARISON: None. HISTORY: ORDERING SYSTEM PROVIDED HISTORY: mass seen on CXR TECHNOLOGIST PROVIDED HISTORY: If patient is on cardiac monitor and/or pulse ox, they may be taken off cardiac monitor and pulse ox, left on O2 if currently on. All monitors reattached when patient returns to room.  Reason for Exam: mass seen spread to the brain. I consulted neurosurgery due to the masses with potential hemorrhage, I spoke with Dr. Severiano Herrera and he advised that oncology would be the ones to make the decisions regarding treatment. He advised that likely she would only be a candidate for radiation therapy. States that this is not something that they would surgically manage. Consult was placed to oncology here at [facility], they spoke to ED attending Dr. Tyler Salcedo, they advised that if the patient would be treated with radiation therapy this would be done at [facility]. Consult has been placed to oncology at [facility], currently awaiting return phone call. While in ED patient received   Medications   levETIRAcetam (KEPPRA) 500 mg in sodium chloride 0.9 % 100 mL IVPB (has no administration in time range)   Dexamethasone Sodium Phosphate injection 10 mg (has no administration in time range)   iopamidol (ISOVUE-370) 76 % injection 75 mL (75 mLs Intravenous Given 8/21/19 1417)     I spoke with Dr. Barber Tolbert, hospitalist at [facility] he does agree to accept the patient. She will be admitted for metastatic brain cancer, she will be admitted to a progressive/telemetry unit. Patient will be given Keppra and Decadron here in the ED prior to her transfer. Patient, her , and both daughters have been updated as to the plan of care and are in agreement. Lázaro Carballo will be transferred to [facility], [facility], for further observation and evaluation of Lázaro Carballo current condition. As I have deemed necessary from their history, physical, and studies, I have considered and evaluated Lázaro Carballo for the following diagnoses:  DIABETES, INTRACRANIAL HEMORRHAGE, MENINGITIS, SUBARACHNOID HEMORRHAGE, SUBDURAL HEMATOMA, & STROKE. CLINICAL IMPRESSION  1. Brain mass    2. Progressive focal motor weakness    3. Lung mass    4.  Dementia without behavioral disturbance, unspecified dementia type        DISPOSITION  Patient to

## 2019-08-21 NOTE — ED PROVIDER NOTES
infratentorial masses, some hemorrhagic. Lung primary is suspected. Critical results were called by Dr. Noam Moreno MD to [address] [place] [address] on 8/21/2019 at 14:28. RECOMMENDATIONS: Follow-up MRI with gadolinium would evaluate further. Ct Chest W Contrast    Result Date: 8/21/2019  EXAMINATION: CT OF THE CHEST WITH CONTRAST 8/21/2019 2:14 pm TECHNIQUE: CT of the chest was performed with the administration of intravenous contrast. Multiplanar reformatted images are provided for review. Dose modulation, iterative reconstruction, and/or weight based adjustment of the mA/kV was utilized to reduce the radiation dose to as low as reasonably achievable. COMPARISON: None. HISTORY: ORDERING SYSTEM PROVIDED HISTORY: mass seen on CXR TECHNOLOGIST PROVIDED HISTORY: If patient is on cardiac monitor and/or pulse ox, they may be taken off cardiac monitor and pulse ox, left on O2 if currently on. All monitors reattached when patient returns to room. Reason for Exam: mass seen on CXR, left arm weakness/loss of use over the past 2-3 days Acuity: Acute Type of Exam: Initial Relevant Medical/Surgical History: no hx of surg to chest; former smoker FINDINGS: Mediastinum: Coronary artery calcifications are present. Ascending aorta at the upper limits of normal.  Severe narrowing of left upper lobe arteries. Occlusion multiple left upper lobe veins. Large left apical mass which is difficult to separate from postobstructive consolidation. The mass likely measures 6.8 x 4 cm on series 2, image 37 with adjacent postobstructive consolidation. Direct invasion into the mediastinum. Significant mediastinal lymphadenopathy. Largest conglomerate which is reproducible and separate from the mass measures 4.8 x 2.9 cm on series 2, image 32 in the prevascular space. Left axillary lymphadenopathy measuring up to 3.3 x 2.1 cm on series 2, image 35. Lungs/pleura: Occlusion of left upper lobe airways. Small left-sided pleural effusion.   No

## 2019-08-22 ENCOUNTER — APPOINTMENT (OUTPATIENT)
Dept: CT IMAGING | Age: 74
DRG: 981 | End: 2019-08-22
Attending: INTERNAL MEDICINE
Payer: MEDICARE

## 2019-08-22 ENCOUNTER — SURGICAL CONSULT (OUTPATIENT)
Dept: RADIATION ONCOLOGY | Age: 74
End: 2019-08-22

## 2019-08-22 ENCOUNTER — APPOINTMENT (OUTPATIENT)
Dept: MRI IMAGING | Age: 74
DRG: 981 | End: 2019-08-22
Attending: INTERNAL MEDICINE
Payer: MEDICARE

## 2019-08-22 ENCOUNTER — PROCEDURE VISIT (OUTPATIENT)
Dept: RADIATION ONCOLOGY | Age: 74
End: 2019-08-22

## 2019-08-22 DIAGNOSIS — C79.31 SECONDARY MALIGNANT NEOPLASM OF BRAIN (HCC): Primary | ICD-10-CM

## 2019-08-22 LAB
ANION GAP SERPL CALCULATED.3IONS-SCNC: 14 MMOL/L (ref 3–16)
BASOPHILS ABSOLUTE: 0 K/UL (ref 0–0.2)
BASOPHILS RELATIVE PERCENT: 0.2 %
BUN BLDV-MCNC: 16 MG/DL (ref 7–20)
CALCIUM SERPL-MCNC: 8.9 MG/DL (ref 8.3–10.6)
CHLORIDE BLD-SCNC: 95 MMOL/L (ref 99–110)
CO2: 21 MMOL/L (ref 21–32)
CREAT SERPL-MCNC: 0.9 MG/DL (ref 0.6–1.2)
EOSINOPHILS ABSOLUTE: 0 K/UL (ref 0–0.6)
EOSINOPHILS RELATIVE PERCENT: 0 %
GFR AFRICAN AMERICAN: >60
GFR NON-AFRICAN AMERICAN: >60
GLUCOSE BLD-MCNC: 177 MG/DL (ref 70–99)
GLUCOSE BLD-MCNC: 184 MG/DL (ref 70–99)
HCT VFR BLD CALC: 35.5 % (ref 36–48)
HEMOGLOBIN: 12.1 G/DL (ref 12–16)
INR BLD: 1.04 (ref 0.86–1.14)
LYMPHOCYTES ABSOLUTE: 0.9 K/UL (ref 1–5.1)
LYMPHOCYTES RELATIVE PERCENT: 12.6 %
MCH RBC QN AUTO: 30.1 PG (ref 26–34)
MCHC RBC AUTO-ENTMCNC: 33.9 G/DL (ref 31–36)
MCV RBC AUTO: 88.8 FL (ref 80–100)
MONOCYTES ABSOLUTE: 0.1 K/UL (ref 0–1.3)
MONOCYTES RELATIVE PERCENT: 1.1 %
NEUTROPHILS ABSOLUTE: 5.8 K/UL (ref 1.7–7.7)
NEUTROPHILS RELATIVE PERCENT: 86.1 %
PDW BLD-RTO: 15.2 % (ref 12.4–15.4)
PERFORMED ON: ABNORMAL
PLATELET # BLD: 337 K/UL (ref 135–450)
PMV BLD AUTO: 7.3 FL (ref 5–10.5)
POTASSIUM REFLEX MAGNESIUM: 4.2 MMOL/L (ref 3.5–5.1)
PROTHROMBIN TIME: 11.8 SEC (ref 9.8–13)
RBC # BLD: 4 M/UL (ref 4–5.2)
SODIUM BLD-SCNC: 130 MMOL/L (ref 136–145)
WBC # BLD: 6.8 K/UL (ref 4–11)

## 2019-08-22 PROCEDURE — 6370000000 HC RX 637 (ALT 250 FOR IP): Performed by: SURGERY

## 2019-08-22 PROCEDURE — 6370000000 HC RX 637 (ALT 250 FOR IP)

## 2019-08-22 PROCEDURE — 6360000002 HC RX W HCPCS: Performed by: SURGERY

## 2019-08-22 PROCEDURE — 85610 PROTHROMBIN TIME: CPT

## 2019-08-22 PROCEDURE — 6370000000 HC RX 637 (ALT 250 FOR IP): Performed by: STUDENT IN AN ORGANIZED HEALTH CARE EDUCATION/TRAINING PROGRAM

## 2019-08-22 PROCEDURE — 70551 MRI BRAIN STEM W/O DYE: CPT

## 2019-08-22 PROCEDURE — 99222 1ST HOSP IP/OBS MODERATE 55: CPT | Performed by: NURSE PRACTITIONER

## 2019-08-22 PROCEDURE — 80048 BASIC METABOLIC PNL TOTAL CA: CPT

## 2019-08-22 PROCEDURE — 36415 COLL VENOUS BLD VENIPUNCTURE: CPT

## 2019-08-22 PROCEDURE — 2000000000 HC ICU R&B

## 2019-08-22 PROCEDURE — C9113 INJ PANTOPRAZOLE SODIUM, VIA: HCPCS | Performed by: SURGERY

## 2019-08-22 PROCEDURE — 99223 1ST HOSP IP/OBS HIGH 75: CPT | Performed by: INTERNAL MEDICINE

## 2019-08-22 PROCEDURE — 6360000002 HC RX W HCPCS

## 2019-08-22 PROCEDURE — 6360000004 HC RX CONTRAST MEDICATION: Performed by: INTERNAL MEDICINE

## 2019-08-22 PROCEDURE — 85025 COMPLETE CBC W/AUTO DIFF WBC: CPT

## 2019-08-22 PROCEDURE — 2580000003 HC RX 258: Performed by: SURGERY

## 2019-08-22 PROCEDURE — 6370000000 HC RX 637 (ALT 250 FOR IP): Performed by: NURSE PRACTITIONER

## 2019-08-22 PROCEDURE — 6360000002 HC RX W HCPCS: Performed by: NURSE PRACTITIONER

## 2019-08-22 PROCEDURE — 74177 CT ABD & PELVIS W/CONTRAST: CPT

## 2019-08-22 RX ORDER — SENNA PLUS 8.6 MG/1
1 TABLET ORAL NIGHTLY
Status: DISCONTINUED | OUTPATIENT
Start: 2019-08-22 | End: 2019-08-28 | Stop reason: HOSPADM

## 2019-08-22 RX ORDER — NICOTINE POLACRILEX 4 MG
15 LOZENGE BUCCAL PRN
Status: DISCONTINUED | OUTPATIENT
Start: 2019-08-22 | End: 2019-08-28 | Stop reason: HOSPADM

## 2019-08-22 RX ORDER — DEXTROSE MONOHYDRATE 50 MG/ML
100 INJECTION, SOLUTION INTRAVENOUS PRN
Status: DISCONTINUED | OUTPATIENT
Start: 2019-08-22 | End: 2019-08-28 | Stop reason: HOSPADM

## 2019-08-22 RX ORDER — DEXTROSE MONOHYDRATE 25 G/50ML
12.5 INJECTION, SOLUTION INTRAVENOUS PRN
Status: DISCONTINUED | OUTPATIENT
Start: 2019-08-22 | End: 2019-08-28 | Stop reason: HOSPADM

## 2019-08-22 RX ORDER — LORAZEPAM 2 MG/ML
INJECTION INTRAMUSCULAR
Status: COMPLETED
Start: 2019-08-22 | End: 2019-08-22

## 2019-08-22 RX ORDER — PANTOPRAZOLE SODIUM 20 MG/1
40 TABLET, DELAYED RELEASE ORAL
Status: DISCONTINUED | OUTPATIENT
Start: 2019-08-23 | End: 2019-08-28 | Stop reason: HOSPADM

## 2019-08-22 RX ORDER — LORAZEPAM 2 MG/ML
1 INJECTION INTRAMUSCULAR ONCE
Status: COMPLETED | OUTPATIENT
Start: 2019-08-22 | End: 2019-08-22

## 2019-08-22 RX ORDER — DEXAMETHASONE 4 MG/1
4 TABLET ORAL EVERY 6 HOURS
Status: DISCONTINUED | OUTPATIENT
Start: 2019-08-22 | End: 2019-08-28 | Stop reason: HOSPADM

## 2019-08-22 RX ADMIN — SIMVASTATIN 40 MG: 40 TABLET, FILM COATED ORAL at 20:39

## 2019-08-22 RX ADMIN — LISINOPRIL 20 MG: 20 TABLET ORAL at 09:03

## 2019-08-22 RX ADMIN — SODIUM CHLORIDE: 9 INJECTION, SOLUTION INTRAVENOUS at 18:29

## 2019-08-22 RX ADMIN — AMLODIPINE BESYLATE 10 MG: 10 TABLET ORAL at 09:03

## 2019-08-22 RX ADMIN — TRAZODONE HYDROCHLORIDE 25 MG: 50 TABLET ORAL at 20:38

## 2019-08-22 RX ADMIN — MEMANTINE HYDROCHLORIDE 5 MG: 5 TABLET ORAL at 09:02

## 2019-08-22 RX ADMIN — PANTOPRAZOLE SODIUM 40 MG: 40 INJECTION, POWDER, LYOPHILIZED, FOR SOLUTION INTRAVENOUS at 09:03

## 2019-08-22 RX ADMIN — CITALOPRAM HYDROBROMIDE 20 MG: 20 TABLET ORAL at 09:03

## 2019-08-22 RX ADMIN — LORAZEPAM 1 MG: 2 INJECTION INTRAMUSCULAR; INTRAVENOUS at 21:46

## 2019-08-22 RX ADMIN — LEVETIRACETAM 500 MG: 500 TABLET, FILM COATED ORAL at 09:02

## 2019-08-22 RX ADMIN — MEMANTINE HYDROCHLORIDE 5 MG: 5 TABLET ORAL at 22:39

## 2019-08-22 RX ADMIN — Medication 10 ML: at 09:03

## 2019-08-22 RX ADMIN — DONEPEZIL HYDROCHLORIDE 5 MG: 10 TABLET, FILM COATED ORAL at 20:38

## 2019-08-22 RX ADMIN — INSULIN LISPRO 1 UNITS: 100 INJECTION, SOLUTION INTRAVENOUS; SUBCUTANEOUS at 20:37

## 2019-08-22 RX ADMIN — DEXAMETHASONE 4 MG: 4 TABLET ORAL at 18:29

## 2019-08-22 RX ADMIN — LORAZEPAM 1 MG: 2 INJECTION INTRAMUSCULAR at 21:46

## 2019-08-22 RX ADMIN — Medication 10 ML: at 20:36

## 2019-08-22 RX ADMIN — METOPROLOL TARTRATE 100 MG: 100 TABLET ORAL at 09:03

## 2019-08-22 RX ADMIN — DEXAMETHASONE 4 MG: 4 TABLET ORAL at 20:39

## 2019-08-22 RX ADMIN — DEXAMETHASONE 4 MG: 4 TABLET ORAL at 09:03

## 2019-08-22 RX ADMIN — SENNOSIDES 8.6 MG: 8.6 TABLET, FILM COATED ORAL at 20:40

## 2019-08-22 RX ADMIN — LEVETIRACETAM 500 MG: 500 TABLET, FILM COATED ORAL at 20:40

## 2019-08-22 RX ADMIN — IOPAMIDOL 80 ML: 755 INJECTION, SOLUTION INTRAVENOUS at 22:21

## 2019-08-22 ASSESSMENT — ENCOUNTER SYMPTOMS
COUGH: 1
NAUSEA: 0
GASTROINTESTINAL NEGATIVE: 1
ABDOMINAL PAIN: 0
SHORTNESS OF BREATH: 0
ALLERGIC/IMMUNOLOGIC NEGATIVE: 1
EYES NEGATIVE: 1

## 2019-08-22 ASSESSMENT — PAIN SCALES - GENERAL
PAINLEVEL_OUTOF10: 0
PAINLEVEL_OUTOF10: 0

## 2019-08-22 NOTE — CONSULTS
8/21/2019  Pattern suspicious for multiple supratentorial and infratentorial masses, some hemorrhagic. Lung primary is suspected. Ct Chest W Contrast  Result Date: 8/21/2019  Large left apical mass which is difficult to separate from adjacent consolidation but likely measures up to 6.8 cm. Extensive mediastinal and axillary lymphadenopathy. Multiple sites are amenable to percutaneous biopsy, though axillary lymphadenopathy may be easiest. Trace left pleural effusion. Labs:  Recent Labs     08/22/19  0458   WBC 6.8   HGB 12.1   HCT 35.5*          Recent Labs     08/22/19  0458   *   K 4.2   CL 95*   CO2 21   BUN 16   CREATININE 0.9   GLUCOSE 177*   CALCIUM 8.9       No results for input(s): PROTIME, INR, APTT in the last 72 hours. Patient Active Problem List    Diagnosis Date Noted    Metastatic lung carcinoma, unspecified laterality ([facility] [address] 08/21/2019    Age-related osteoporosis without current pathological fracture 06/17/2019    Primary insomnia 04/29/2019    Encephalopathy 04/06/2019    Finger dislocation, initial encounter 03/11/2019    Dyslipidemia 10/29/2018    Late onset Alzheimer's disease with behavioral disturbance 09/07/2017    Essential hypertension 02/02/2015    Dysthymia 02/02/2015    Alzheimer's disease 02/02/2015    Tobacco use 08/16/2013    Hyperlipidemia 05/08/2013    OA (osteoarthritis) 05/08/2013       Assessment:  Patient is a 69 y.o. female w/multiple hemorrhagic brain mets with vasogenic edema and brain compression. Plan:  1. No emergent neurosurgical intervention indicated  2. Neurologic exams frequency:  - ICU: Q2H until otherwise directed  3. For change in exam MUST contact neurosurgery team along with critical care or primary team  4. Brain Mass:  - MRI Brain w wo to better evaluate masses seen on CT Head  - Oncology consulted, appreciate recs  - Radiation Oncology, appreciate recs  5.  Cerebral edema:  - Keep Na within normal limits  - Decadron 4 mg

## 2019-08-22 NOTE — CONSULTS
(spouse)    Review of Systems -   General ROS: positive for  - fatigue and weight loss  Psychological ROS: negative  ENT ROS: negative  Hematological and Lymphatic ROS: negative  Respiratory ROS: positive for - cough  Cardiovascular ROS: negative  Gastrointestinal ROS: negative  Genito-Urinary ROS: negative  Musculoskeletal ROS: positive for - gait disturbance and muscular weakness  Neurological ROS: positive for - confusion, impaired coordination/balance and weakness    Objective:        PHYSICAL EXAM:    General appearance: alert, appears stated age, cooperative and no distress  Head: Normocephalic, without obvious abnormality, atraumatic  Eyes: negative findings: lids and lashes normal and conjunctivae and sclerae normal  Lungs: clear to auscultation bilaterally  Heart: regular rate and rhythm  Abdomen: soft, non-tender; bowel sounds normal; no masses,  no organomegaly  Extremities: extremities normal, atraumatic, no cyanosis or edema  Neurologic: alert and oriented today     Palliative Performance Scale:  60% ? Ambulation reduced; Significant disease; Can't do hobbies/housework; intake normal   or reduced; occasional assist; LOC full/confusion  50% ? Mainly sit/lie; Extensive disease; Can't do any work; Considerable assist; intake normal  Or reduced; LOC full/confusion  40% ? Mainly in bed; Extensive disease; Mainly assist; intake normal or reduced; occasional assist; LOC full/confusion  30% ? Bed Bound; Extensive disease; Total care; intake reduced; LOC full/confusion  20% ? Bed Bound; Extensive disease; Total care; intake minimal; Drowsy/coma  10% ? Bed Bound; Extensive disease; Total care; Mouth care only; Drowsy/coma  0 ?  Death    PPS: 40%    Vitals:    /73   Pulse 83   Temp 97.9 °F (36.6 °C) (Oral)   Resp 19   Ht 5' 1\" (1.549 m)   Wt 160 lb 0.9 oz (72.6 kg)   LMP  (LMP Unknown)   SpO2 95%   BMI 30.24 kg/m²     DATA:    CBC with Differential:    Lab Results   Component Value Date    WBC 6.8 2019    RBC 4.00 2019    HGB 12.1 2019    HCT 35.5 2019     2019    MCV 88.8 2019    MCH 30.1 2019    MCHC 33.9 2019    RDW 15.2 2019    LYMPHOPCT 12.6 2019    MONOPCT 1.1 2019    BASOPCT 0.2 2019    MONOSABS 0.1 2019    LYMPHSABS 0.9 2019    EOSABS 0.0 2019    BASOSABS 0.0 2019     CMP:    Lab Results   Component Value Date     2019    K 4.2 2019    CL 95 2019    CO2 21 2019    BUN 16 2019    CREATININE 0.9 2019    GFRAA >60 2019    AGRATIO 1.0 2019    LABGLOM >60 2019    GLUCOSE 177 2019    PROT 7.3 2019    PROT 7.6 2011    LABALBU 3.7 2019    CALCIUM 8.9 2019    BILITOT 0.4 2019    ALKPHOS 50 2019    AST 35 2019    ALT 32 2019     Hepatic Function Panel:    Lab Results   Component Value Date    ALKPHOS 50 2019    ALT 32 2019    AST 35 2019    PROT 7.3 2019    PROT 7.6 2011    BILITOT 0.4 2019    BILIDIR <0.2 2017    IBILI see below 2017    LABALBU 3.7 2019     Albumin:    Lab Results   Component Value Date    LABALBU 3.7 2019         Conclusion/Time spent:         Recommendations see above    Time spent with patient and/or family: 30  Time reviewing records: 10  Time communicating with staff: 10    A total of 50 minutes spent with the patient and family on unit greater than 50% in counseling regarding palliative care and in goals of care for the patient. Thank you to Dr. Jarad Fulton for this consultation. We will continue to follow Ms. Jose Messer care as needed.         Malick Jacobo, APRN/CNP  Palliative Care  [phone]

## 2019-08-22 NOTE — CONSULTS
and EOM are normal.   Neck: Neck supple. Cardiovascular: Normal rate, regular rhythm, normal heart sounds and intact distal pulses. Pulmonary/Chest: Effort normal and breath sounds normal. No respiratory distress. Abdominal: Soft. Bowel sounds are normal. She exhibits no distension. Musculoskeletal: She exhibits no edema. - decreased strength in L arm   Neurological: She is alert and oriented to person, place, and time. Skin: Skin is warm and dry. She is not diaphoretic.   - subcutaneous nodule palpated in the left lateral breast   Psychiatric: She has a normal mood and affect. Her behavior is normal. Thought content normal.       DATA:     Labs:  CBC:   Recent Labs     08/21/19  1258 08/22/19  0458   WBC 6.1 6.8   HGB 11.5* 12.1   HCT 33.8* 35.5*    337       BMP:   Recent Labs     08/20/19  1545 08/21/19  1258 08/22/19  0458   * 130* 130*   K 4.6 4.2 4.2   CL 94* 93* 95*   CO2 26 26 21   BUN 19 19 16   CREATININE 1.1 1.0 0.9   GLUCOSE 100* 119* 177*     LFT's:   Recent Labs     08/21/19  1258   AST 35   ALT 32   BILITOT 0.4   ALKPHOS 50     Troponin:   Recent Labs     08/21/19  1258   TROPONINI <0.01       U/A:  Recent Labs     08/21/19  1456   COLORU Straw   PHUR 7.0   CLARITYU Clear   SPECGRAV <=1.005   LEUKOCYTESUR Negative   UROBILINOGEN 0.2   BILIRUBINUR MODERATE*   BLOODU Negative   GLUCOSEU Negative       CT ABDOMEN PELVIS W IV CONTRAST Additional Contrast? None    (Results Pending)   MRI BRAIN W WO CONTRAST    (Results Pending)   IR US GUIDED NEEDLE PLACEMENT    (Results Pending)       ASSESSMENT AND PLAN:   Felix Burnham is a 69 y.o. female, who was transferred from [facility] for [address] [place] [address] and oncology evaluation and treatment of probable metastatic lung cancer to brain.     Multiple brain masses with components of hemorrhage and vasogenic edema  - no surgical interventions planned at this time  - decadron and keppra per med team  - Agree with radiation oncology consultation.   -

## 2019-08-22 NOTE — H&P
ICU HISTORY AND PHYSICAL       Hospital Day: 1  ICU Day:  1                                                        Code:Prior  Admit Date: 8/21/2019  PCP: CHRISTINA Calabrese CNP                                  CC: Left upper extremity weakness/clumsiness    HISTORY OF PRESENT ILLNESS:   69 yof with pertinent history of fairly advanced Alzheimer's dementia, apical left lung mass with multiple metastases to brain.  drove the patient to [facility] due to progressively worsening left upper extremity weakness that started Monday (8/19). Per chart review, he noticed that she was having trouble holding a fork and getting dressed. He did not note any deficits in her right upper extremity. No facial droop or slurring of speech was reported either. In conversation with patient, she was found to be in no acute distress, but lacked any situational awareness. She is oriented only to person. PAST HISTORY:     Past Medical History:   Diagnosis Date    Arthritis     Dementia     Depression     Eczema     Hyperlipidemia     Hypertension     Metastatic lung carcinoma, unspecified laterality ([facility] [address] 8/21/2019    Osteoarthritis     Osteoporosis        Past Surgical History:   Procedure Laterality Date    TONSILLECTOMY         SocialHistory:   The patient lives at home with . Incapable of independent ADLs. Alcohol: none  Illicit drugs: no use  Tobacco:  none    Family History:  Family History   Problem Relation Age of Onset    Diabetes Father     Heart Disease Father        MEDICATIONS:     No current facility-administered medications on file prior to encounter.       Current Outpatient Medications on File Prior to Encounter   Medication Sig Dispense Refill    traZODone (DESYREL) 50 MG tablet Take 1/2 tab by mouth at night, if does not help take 1 tab at night for sleep 90 tablet 1    amLODIPine (NORVASC) 10 MG tablet Take 1 tablet by mouth daily 90 tablet 1    citalopram (CELEXA) 20 MG tablet TAKE 1 TABLET EVERY DAY 90 tablet 1    lisinopril (PRINIVIL;ZESTRIL) 20 MG tablet Take 1 tablet by mouth daily 90 tablet 1    metoprolol (LOPRESSOR) 100 MG tablet TAKE 1 TABLET EVERY DAY 90 tablet 1    azithromycin (ZITHROMAX) 250 MG tablet Take 2 tabs (500 mg) on Day 1, and take 1 tab (250 mg) on days 2 through 5. 1 packet 0    simvastatin (ZOCOR) 40 MG tablet Take 40 mg by mouth nightly      alendronate (FOSAMAX) 70 MG tablet Take 1 tablet by mouth every 7 days With a glass of water before first food/med/drink,avoid lying down for 30min 12 tablet 1    donepezil (ARICEPT) 5 MG tablet Take 1 tablet by mouth nightly 90 tablet 3    polyethylene glycol (GLYCOLAX) powder Take 17 g every three days as needed for constipation 500 g 1    memantine ER (NAMENDA XR) 14 MG CP24 extended release capsule Take 1 capsule by mouth daily 90 capsule 0    VITAMIN D, CHOLECALCIFEROL, PO Take 1 tablet by mouth daily      Calcium Carb-Cholecalciferol (CALCIUM 1000 + D PO) Take by mouth      Multiple Vitamins-Minerals (THERAPEUTIC MULTIVITAMIN-MINERALS) tablet Take 1 tablet by mouth daily.  aspirin 81 MG tablet Take 81 mg by mouth daily. Scheduled Meds:   Continuous Infusions:  PRN Meds:    Allergies: No Known Allergies    REVIEW OF SYSTEMS:       History obtained from chart review, patient    Review of Systems  13 point ROS negative except as stated in HPI. PHYSICAL EXAM:       Vitals: LMP  (LMP Unknown)     I/O:  No intake or output data in the 24 hours ending 08/21/19 2144  No intake/output data recorded. No intake/output data recorded. Physical Examination:     Physical Exam   Constitutional: She appears well-developed and well-nourished. No distress. HENT:   Head: Normocephalic and atraumatic. Eyes: Pupils are equal, round, and reactive to light. Conjunctivae and EOM are normal.   Neck: Normal range of motion. Neck supple. No JVD present. No tracheal deviation present. No thyromegaly present.

## 2019-08-22 NOTE — PROGRESS NOTES
Psychiatric: She has a normal mood and affect. Pleasantly demented. Alert and oriented to self and situation    LABS:    CBC:   Recent Labs     08/21/19  1258 08/22/19  0458   WBC 6.1 6.8   HGB 11.5* 12.1   HCT 33.8* 35.5*    337   MCV 87.5 88.8     Renal:    Recent Labs     08/20/19  1545 08/21/19  1258 08/22/19  0458   * 130* 130*   K 4.6 4.2 4.2   CL 94* 93* 95*   CO2 26 26 21   BUN 19 19 16   CREATININE 1.1 1.0 0.9   GLUCOSE 100* 119* 177*   CALCIUM 9.8 9.0 8.9   ANIONGAP 12 11 14     Hepatic:   Recent Labs     08/21/19  1258   AST 35   ALT 32   BILITOT 0.4   PROT 7.3   LABALBU 3.7   ALKPHOS 50     Troponin:   Recent Labs     08/21/19  1258   TROPONINI <0.01     BNP: No results for input(s): BNP in the last 72 hours. Lipids: No results for input(s): CHOL, HDL in the last 72 hours. Invalid input(s): LDLCALCU, TRIGLYCERIDE  ABGs:  No results for input(s): PHART, VRK3ZNU, PO2ART, EDM7EOP, BEART, THGBART, G9RHRSPQ, PBG1ZBV in the last 72 hours. INR: No results for input(s): INR in the last 72 hours. Lactate: No results for input(s): LACTATE in the last 72 hours. Cultures:  -----------------------------------------------------------------  RAD:   No orders to display         Assessment/Plan:   Georgi Holland is a 69 y.o. female with a PMH of Lung cancer (with brain mets), Alzheimer's dementia, HTN, HLD, and Osteoporosis who was transferred from [facility] for [address] [place] [address] and oncology evaluation and treatment of metastatic lung cancer to brain. Apical lung mass concerning for malignancy, with multiple enlarged lymph nodes   Patient is a long term smoker. No previous scans done and patient/family did not know about the mass until this week. - Obtain CT Abdomen and Pelvis  - Obtain ultrasound guided biopsy of lymph node     Multiple brain masses with components of hemorrhage and vasogenic edema  Neurosurgery and oncology on board. Left upper extremity weakness was her presenting symptom.   - Continue decadron and keppra   - Staging of lung mass to dictate management/intervention  - Palliative care consulted     Hyponatremia, potentially secondary to paraneoplastic process  Mild and asymptomatic at 130  - NS at 50 cc/hr  - Continue to monitor sodium    Chronic Problems:  Hypertension - continue home Norvasc and lopressor  Alzheimer's dementia - continue home memantine and donepezil  Insomnia - continue home trazodone  Depression- continue home Celexa  Osteoporosis - continue home vitamin D, calcium, and alendronate  Constipation - continue home Glycolax       Code Status: Full  FEN: NPO (sips and chips)  PPX:  SCDs, Protonix 40 daily  DISPO: ICU for q2 hours neurochecks    Mary Lou Baptiste MD, PGY-1  08/22/19  7:20 AM    This patient has been staffed and discussed with Dr. Mary Camp    Patient seen, examined and discussed with the resident and I agree with the assessment and plan as edited above. See consult note for my full thoughts. Plan today for MRI to assess additional brain metastases. Easy his course of biopsy for diagnosis and confirmatory staging will be a needle biopsy of the palpable lymph node in the lateral aspect of her left breast/axilla.     Dre Slade MD

## 2019-08-22 NOTE — CONSULTS
to my knowledge. This could be something that is grown for several years or something that has come on rather abruptly in the past several weeks. I suspect the latter and would favor this being a small cell lung cancer but she will need a biopsy to prove it one where the other. Based on the palpable nodule in the breast I think going for a ultrasound-guided biopsy of that first would be the least invasive approach would provide staging and tissue diagnosis. This information was covered with the  and patient. With her dementia it is unclear how much she can process but she appeared to follow the conversation and asked some appropriate questions. Oncology has been consulted as has radiation oncology. Neurosurgery consulted but I do not think there is an intervention for them as we will likely obtain a diagnosis without brain surgery. From there she will likely need either whole brain radiation or gamma knife. We will continue every 2 hours neuro checks given the hemorrhagic conversion of her likely brain mets. If she remains stable she could transfer out of the ICU is early as tomorrow for continued work-up and management. Currently n.p.o. for needle biopsy. MRI pending for today as well I suspect more lesions will be found on MRI then can be seen on CT scan.       Sebastien Garcia MD

## 2019-08-22 NOTE — CONSULTS
[address]               [address] [facility], [address] [place] [address]                                  CONSULTATION    PATIENT NAME: Marlen Grider                      :        1945  MED REC NO:   [id number]                          ROOM:       4516  ACCOUNT NO:   [de-identified]                           ADMIT DATE: 2019  PROVIDER:     Michael Ayala MD    CONSULT DATE:  2019    RADIATION ONCOLOGY CONSULT NOTE    HISTORY OF PRESENT ILLNESS:  The patient is an [facility] resident, who is  seen today at the request of her admitting physician. She has a past  medical history of severe Alzheimer's disease. However, she was  basically doing well with the very caring . Over the last  several days prior to admission on , the  noticed that the  patient having difficulty with left side holding the fork and left upper  and lower extremity weakness. Did not notice any other deficits such as  seizures, facial droop, or acute distress. She was brought to [facility] ER with multiple brain metastases and findings in the chest  were noted. The patient was reported to [address] [place] [address] HISTORY:  The patient's past medical history is significant  for the dementia as above, depression, hyperlipidemia, hypertension,  osteoarthritis, osteoporosis, and the finding on admission of multiple  findings in the brain. PAST SURGICAL HISTORY:  The patient's past surgical history is  significant for tonsillectomy. SOCIAL HISTORY:  The patient lives with her very caring . She is  incapable of independent living. Alcohol none. Tobacco none. FAMILY HISTORY:  Significant for father with heart disease and her  mother with heart disease. CURRENT MEDICATIONS:  At home were trazodone, Norgesic, Prinivil,  Lopressor, Zocor, and Fosamax as well as Aricept, Namenda, vitamin D,  and aspirin. ALLERGIES:  None known.     REVIEW OF

## 2019-08-22 NOTE — FLOWSHEET NOTE
08/22/19 1025   Encounter Summary   Services provided to: Patient   Referral/Consult From: Rounding   Continue Visiting   (Seen 8/22/19, AN. )   Complexity of Encounter Moderate   Length of Encounter 15 minutes   Routine   Type Initial   Assessment Approachable   Intervention Nurtured hope   Outcome Expressed gratitude

## 2019-08-22 NOTE — PROGRESS NOTES
HOSPITAL MEDICINE  - PROGRESS NOTE    Admit Date: 8/21/2019         Interval History:    69 y.o. female with Alzheimer's dementia,  presenting with L UE weakness.     -assoc  difficulty getting dressed. CT chest which showed a large L apical mass (6.8 cm) with mediastinal and axillary LAD and   - CT head significant for suspicious supratentorial and infratentorial masses.    workup in progress with MRI      Poor po intake. No HA  Daughter at bedside    Diet: Diet NPO Effective Now Exceptions are: Ice Chips, Sips with Meds, Other (See Comment)      Data:   Scheduled Meds: Reviewed  Continuous Infusions:   sodium chloride 50 mL/hr at 08/21/19 2316       Intake/Output Summary (Last 24 hours) at 8/22/2019 1322  Last data filed at 8/22/2019 0517  Gross per 24 hour   Intake --   Output 600 ml   Net -600 ml     CBC:   Recent Labs     08/22/19  0458   WBC 6.8   HGB 12.1        BMP:  Recent Labs     08/22/19  0458   *   K 4.2   CL 95*   CO2 21   BUN 16   CREATININE 0.9   GLUCOSE 177*         Objective:   Vitals: BP (!) 140/81   Pulse 72   Temp 97.6 °F (36.4 °C) (Oral)   Resp 21   Ht 5' 1\" (1.549 m)   Wt 160 lb 0.9 oz (72.6 kg)   LMP  (LMP Unknown)   SpO2 97%   BMI 30.24 kg/m²   General appearance: alert, appears stated age and cooperative  Skin: Skin color, texture, turgor normal.   HEENT: Head: Normocephalic, no lesions, without obvious abnormality.   Neck: supple  Lungs: clear to auscultation bilaterally  Heart: regular rate and rhythm, S1, S2 normal, no murmur, click, rub or gallop  Abdomen: soft, non-tender; bowel sounds normal; no masses,  no organomegaly  Extremities: no edema    Neurologic: Mental status: Alert,LUE weakness      Assessment & Plan:        Metastatic lung carcinoma, unspecified laterality (HCC)  -CT with Large left apical mass in the lung with extensive mediastinal and axillary lymphadenopathy with multiple supra and

## 2019-08-23 ENCOUNTER — APPOINTMENT (OUTPATIENT)
Dept: ULTRASOUND IMAGING | Age: 74
DRG: 981 | End: 2019-08-23
Attending: INTERNAL MEDICINE
Payer: MEDICARE

## 2019-08-23 LAB
ANION GAP SERPL CALCULATED.3IONS-SCNC: 11 MMOL/L (ref 3–16)
BASOPHILS ABSOLUTE: 0 K/UL (ref 0–0.2)
BASOPHILS RELATIVE PERCENT: 0.4 %
BUN BLDV-MCNC: 27 MG/DL (ref 7–20)
CALCIUM SERPL-MCNC: 8.6 MG/DL (ref 8.3–10.6)
CHLORIDE BLD-SCNC: 101 MMOL/L (ref 99–110)
CO2: 21 MMOL/L (ref 21–32)
CREAT SERPL-MCNC: 1 MG/DL (ref 0.6–1.2)
EOSINOPHILS ABSOLUTE: 0 K/UL (ref 0–0.6)
EOSINOPHILS RELATIVE PERCENT: 0 %
GFR AFRICAN AMERICAN: >60
GFR NON-AFRICAN AMERICAN: 54
GLUCOSE BLD-MCNC: 172 MG/DL (ref 70–99)
GLUCOSE BLD-MCNC: 177 MG/DL (ref 70–99)
GLUCOSE BLD-MCNC: 193 MG/DL (ref 70–99)
GLUCOSE BLD-MCNC: 267 MG/DL (ref 70–99)
GLUCOSE BLD-MCNC: 280 MG/DL (ref 70–99)
HCT VFR BLD CALC: 35.9 % (ref 36–48)
HEMOGLOBIN: 11.9 G/DL (ref 12–16)
LYMPHOCYTES ABSOLUTE: 1 K/UL (ref 1–5.1)
LYMPHOCYTES RELATIVE PERCENT: 9.4 %
MCH RBC QN AUTO: 30 PG (ref 26–34)
MCHC RBC AUTO-ENTMCNC: 33.3 G/DL (ref 31–36)
MCV RBC AUTO: 90.2 FL (ref 80–100)
MONOCYTES ABSOLUTE: 0.3 K/UL (ref 0–1.3)
MONOCYTES RELATIVE PERCENT: 2.5 %
NEUTROPHILS ABSOLUTE: 9.2 K/UL (ref 1.7–7.7)
NEUTROPHILS RELATIVE PERCENT: 87.7 %
PDW BLD-RTO: 14.8 % (ref 12.4–15.4)
PERFORMED ON: ABNORMAL
PLATELET # BLD: 347 K/UL (ref 135–450)
PMV BLD AUTO: 7.7 FL (ref 5–10.5)
POTASSIUM REFLEX MAGNESIUM: 4.5 MMOL/L (ref 3.5–5.1)
RBC # BLD: 3.98 M/UL (ref 4–5.2)
SODIUM BLD-SCNC: 133 MMOL/L (ref 136–145)
WBC # BLD: 10.5 K/UL (ref 4–11)

## 2019-08-23 PROCEDURE — 2580000003 HC RX 258: Performed by: SURGERY

## 2019-08-23 PROCEDURE — 97162 PT EVAL MOD COMPLEX 30 MIN: CPT

## 2019-08-23 PROCEDURE — 97530 THERAPEUTIC ACTIVITIES: CPT

## 2019-08-23 PROCEDURE — 6370000000 HC RX 637 (ALT 250 FOR IP)

## 2019-08-23 PROCEDURE — 80048 BASIC METABOLIC PNL TOTAL CA: CPT

## 2019-08-23 PROCEDURE — 85025 COMPLETE CBC W/AUTO DIFF WBC: CPT

## 2019-08-23 PROCEDURE — 6370000000 HC RX 637 (ALT 250 FOR IP): Performed by: SURGERY

## 2019-08-23 PROCEDURE — 6360000002 HC RX W HCPCS: Performed by: STUDENT IN AN ORGANIZED HEALTH CARE EDUCATION/TRAINING PROGRAM

## 2019-08-23 PROCEDURE — 0HBU3ZX EXCISION OF LEFT BREAST, PERCUTANEOUS APPROACH, DIAGNOSTIC: ICD-10-PCS | Performed by: RADIOLOGY

## 2019-08-23 PROCEDURE — 1200000000 HC SEMI PRIVATE

## 2019-08-23 PROCEDURE — 97166 OT EVAL MOD COMPLEX 45 MIN: CPT

## 2019-08-23 PROCEDURE — 6370000000 HC RX 637 (ALT 250 FOR IP): Performed by: STUDENT IN AN ORGANIZED HEALTH CARE EDUCATION/TRAINING PROGRAM

## 2019-08-23 PROCEDURE — 76642 ULTRASOUND BREAST LIMITED: CPT

## 2019-08-23 PROCEDURE — 88341 IMHCHEM/IMCYTCHM EA ADD ANTB: CPT

## 2019-08-23 PROCEDURE — A4648 IMPLANTABLE TISSUE MARKER: HCPCS

## 2019-08-23 PROCEDURE — 88305 TISSUE EXAM BY PATHOLOGIST: CPT

## 2019-08-23 PROCEDURE — 97535 SELF CARE MNGMENT TRAINING: CPT

## 2019-08-23 PROCEDURE — 97116 GAIT TRAINING THERAPY: CPT

## 2019-08-23 PROCEDURE — 99233 SBSQ HOSP IP/OBS HIGH 50: CPT | Performed by: INTERNAL MEDICINE

## 2019-08-23 PROCEDURE — 6360000002 HC RX W HCPCS: Performed by: NURSE PRACTITIONER

## 2019-08-23 PROCEDURE — 88342 IMHCHEM/IMCYTCHM 1ST ANTB: CPT

## 2019-08-23 PROCEDURE — 88360 TUMOR IMMUNOHISTOCHEM/MANUAL: CPT

## 2019-08-23 PROCEDURE — 99231 SBSQ HOSP IP/OBS SF/LOW 25: CPT | Performed by: NURSE PRACTITIONER

## 2019-08-23 RX ADMIN — INSULIN LISPRO 1 UNITS: 100 INJECTION, SOLUTION INTRAVENOUS; SUBCUTANEOUS at 13:19

## 2019-08-23 RX ADMIN — MEMANTINE HYDROCHLORIDE 5 MG: 5 TABLET ORAL at 08:58

## 2019-08-23 RX ADMIN — INSULIN LISPRO 2 UNITS: 100 INJECTION, SOLUTION INTRAVENOUS; SUBCUTANEOUS at 20:10

## 2019-08-23 RX ADMIN — LEVETIRACETAM 500 MG: 500 TABLET, FILM COATED ORAL at 08:33

## 2019-08-23 RX ADMIN — Medication 10 ML: at 08:35

## 2019-08-23 RX ADMIN — AMLODIPINE BESYLATE 10 MG: 10 TABLET ORAL at 08:33

## 2019-08-23 RX ADMIN — LISINOPRIL 20 MG: 20 TABLET ORAL at 08:34

## 2019-08-23 RX ADMIN — INSULIN LISPRO 3 UNITS: 100 INJECTION, SOLUTION INTRAVENOUS; SUBCUTANEOUS at 17:16

## 2019-08-23 RX ADMIN — TRAZODONE HYDROCHLORIDE 25 MG: 50 TABLET ORAL at 20:04

## 2019-08-23 RX ADMIN — DONEPEZIL HYDROCHLORIDE 5 MG: 10 TABLET, FILM COATED ORAL at 20:04

## 2019-08-23 RX ADMIN — DEXAMETHASONE 4 MG: 4 TABLET ORAL at 02:31

## 2019-08-23 RX ADMIN — METOPROLOL TARTRATE 100 MG: 100 TABLET ORAL at 08:34

## 2019-08-23 RX ADMIN — DEXAMETHASONE 4 MG: 4 TABLET ORAL at 20:04

## 2019-08-23 RX ADMIN — SIMVASTATIN 40 MG: 40 TABLET, FILM COATED ORAL at 20:04

## 2019-08-23 RX ADMIN — DEXAMETHASONE 4 MG: 4 TABLET ORAL at 08:33

## 2019-08-23 RX ADMIN — MEMANTINE HYDROCHLORIDE 5 MG: 5 TABLET ORAL at 20:04

## 2019-08-23 RX ADMIN — INSULIN LISPRO 1 UNITS: 100 INJECTION, SOLUTION INTRAVENOUS; SUBCUTANEOUS at 08:59

## 2019-08-23 RX ADMIN — DEXAMETHASONE 4 MG: 4 TABLET ORAL at 14:58

## 2019-08-23 RX ADMIN — CITALOPRAM HYDROBROMIDE 20 MG: 20 TABLET ORAL at 08:34

## 2019-08-23 RX ADMIN — SENNOSIDES 8.6 MG: 8.6 TABLET, FILM COATED ORAL at 20:04

## 2019-08-23 RX ADMIN — LEVETIRACETAM 500 MG: 500 TABLET, FILM COATED ORAL at 20:04

## 2019-08-23 RX ADMIN — PANTOPRAZOLE SODIUM 40 MG: 20 TABLET, DELAYED RELEASE ORAL at 06:39

## 2019-08-23 ASSESSMENT — PAIN SCALES - GENERAL
PAINLEVEL_OUTOF10: 0

## 2019-08-23 ASSESSMENT — ENCOUNTER SYMPTOMS
GASTROINTESTINAL NEGATIVE: 1
ABDOMINAL PAIN: 0
NAUSEA: 0
VOICE CHANGE: 0
DIARRHEA: 0
ABDOMINAL DISTENTION: 0
PHOTOPHOBIA: 0
EYES NEGATIVE: 1
WHEEZING: 0
VOMITING: 0
CHEST TIGHTNESS: 0
SHORTNESS OF BREATH: 0
ALLERGIC/IMMUNOLOGIC NEGATIVE: 1
COUGH: 1

## 2019-08-23 NOTE — PROGRESS NOTES
Internal Medicine Transfer Acceptance Note    The patient was seen and examined. This is a 70 yo F PMH alzheimers, HTN p/w left hand weakness, admitted for lung cancer with  brain mets found at [facility]. Pt has  dementia, lives at home with her . Recently had a CT scan that was concerning for metastatic cancer. Oncology has seen the patient and had a biopsy of her lung and are awaiting results for treatment course. At the moment she states that she is feeling fine, in good spirits and has no questions at the moment. The patient denies any NV, chest pain, or pain from biopsy sight, SOB, cough, abdominal pain, diarrhea, fever or chills.        Vitals:    08/23/19 1415   BP: 118/75   Pulse: 80   Resp: 18   Temp:    SpO2: 96%     Scheduled Meds:   dexamethasone  4 mg Oral Q6H    pantoprazole  40 mg Oral QAM AC    senna  1 tablet Oral Nightly    insulin lispro  0-6 Units Subcutaneous TID WC    insulin lispro  0-3 Units Subcutaneous Nightly    amLODIPine  10 mg Oral Daily    citalopram  20 mg Oral Daily    lisinopril  20 mg Oral Daily    metoprolol  100 mg Oral Daily    traZODone  25 mg Oral Nightly    sodium chloride flush  10 mL Intravenous 2 times per day    donepezil  5 mg Oral Nightly    memantine  5 mg Oral BID    simvastatin  40 mg Oral Nightly    levETIRAcetam  500 mg Oral BID     Continuous Infusions:   dextrose      sodium chloride 50 mL/hr at 08/23/19 0837     PRN Meds:glucose, dextrose, glucagon (rDNA), dextrose, sodium chloride flush, ondansetron, polyethylene glycol, acetaminophen    Gen: sitting in chair; no apparent distress  HEENT: MMM  Cardiovascular: +S1, +S2; RRR; holosystolic murmur in left 2nd intercostal space, no rubs or gallops  Lungs:CTAB  Abdomen: soft, non-tender, non-distended, bowel sounds present throughout  Extremities: warm, well-perfused; no cyanosis, no edema, no erythema  Neuro: AAO x1 only to person; no evidence of aphasia or neglect      The objective and

## 2019-08-23 NOTE — PROGRESS NOTES
Diagnosis Date    Arthritis     Dementia     Depression     Eczema     Hyperlipidemia     Hypertension     Metastatic lung carcinoma, unspecified laterality ([facility] [address] 8/21/2019    Osteoarthritis     Osteoporosis        Past Surgical History:   Procedure Laterality Date    TONSILLECTOMY         SocialHistory:   The patient lives at home    Alcohol: no use  Illicit drugs: no use  Tobacco:  Former smoker (quit 3/29/2019)    Family History:  Family History   Problem Relation Age of Onset    Diabetes Father     Heart Disease Father      -\"belly cancer\" in sister    MEDICATIONS:     No current facility-administered medications on file prior to encounter.       Current Outpatient Medications on File Prior to Encounter   Medication Sig Dispense Refill    traZODone (DESYREL) 50 MG tablet Take 1/2 tab by mouth at night, if does not help take 1 tab at night for sleep 90 tablet 1    amLODIPine (NORVASC) 10 MG tablet Take 1 tablet by mouth daily 90 tablet 1    citalopram (CELEXA) 20 MG tablet TAKE 1 TABLET EVERY DAY 90 tablet 1    lisinopril (PRINIVIL;ZESTRIL) 20 MG tablet Take 1 tablet by mouth daily 90 tablet 1    metoprolol (LOPRESSOR) 100 MG tablet TAKE 1 TABLET EVERY DAY 90 tablet 1    azithromycin (ZITHROMAX) 250 MG tablet Take 2 tabs (500 mg) on Day 1, and take 1 tab (250 mg) on days 2 through 5. 1 packet 0    simvastatin (ZOCOR) 40 MG tablet Take 40 mg by mouth nightly      alendronate (FOSAMAX) 70 MG tablet Take 1 tablet by mouth every 7 days With a glass of water before first food/med/drink,avoid lying down for 30min 12 tablet 1    donepezil (ARICEPT) 5 MG tablet Take 1 tablet by mouth nightly 90 tablet 3    polyethylene glycol (GLYCOLAX) powder Take 17 g every three days as needed for constipation 500 g 1    memantine ER (NAMENDA XR) 14 MG CP24 extended release capsule Take 1 capsule by mouth daily 90 capsule 0    VITAMIN D, CHOLECALCIFEROL, PO Take 1 tablet by mouth daily      Calcium

## 2019-08-23 NOTE — PROGRESS NOTES
Palliative Medicine Progress Note    Admit Date: 8/21/2019  Hospital Day:  Hospital Day: 3     CC: Weakness, constipation  HPI: HPI Pmhx for Alzheimer's dementia and a new dx of apical left lung mass with multiple metastases to brain. She had US guided biopsy this morning, path pending. Oncology and rad-onc consults. Recommendations:   Spoke with pt and  and daughter at the bedside. Pt denies pain, sob, or any discomfort. She looks forward to getting OOB with PT/OT.  is planning on not working at his job for a while to be able to be with his wife. Daughters are supporting Mr. Santana Levy and taking turns staying at the bedside with pt. Offered outpatient palliative care to help with symptom management and goals of care/advance care planning once pt has an official diagnosis, and family was agreeable to a referral being made. Provided emotional support to , who seems overwhelmed by multiple doctors and information. 1. Goals of Care/Advanced Care planning/Code status: Full code, did not discuss today. Will wait for biopsy results. 2. Pain: patient denies pain and appears comfortable. 3. SOB: denies sob and is breathing comfortably on room air. 4. Dementia: pt is alert and oriented with some forgetfulness.  does assist her with all ADL's for safety but she was walking independently PTA but had become unsteady over the last few weeks. PT/OT eval pending. 5. Metastatic cancer:  New dx, will need an official biopsy, Rad Onc and Onc have seen her and she will likely have whole brain radiation. Biopsy was taken today. 6. Constipation: small amount of stool last night per . Hopefully pt will have BM when she gets up OOB today. 7. Disposition: d/c home with [place] [id number] when ready. Family agreeable to outpatient palliative care as well.     Subjective:     Scheduled Meds:   dexamethasone  4 mg Oral Q6H    pantoprazole  40 mg Oral QAM AC    senna  1 tablet Oral Nightly    insulin lispro

## 2019-08-23 NOTE — PROGRESS NOTES
Physical Therapy    Facility/Department: [facility] 5T ORTHO/NEURO  Initial Assessment/Treatment    NAME: Stephanie Rutledge  : 1945  MRN: [id number]    Date of Service: 2019    Discharge Recommendations:    Stephanie Rutledge scored a 17/24 on the AM-PAC short mobility form. Current research shows that an AM-PAC score of 17 or less is typically not associated with a discharge to the patient's home setting. Based on the patients AM-PAC score and their current functional mobility deficits, it is recommended that the patient have 3-5 sessions per week of Physical Therapy at d/c to increase the patients independence. PT Equipment Recommendations  Equipment Needed: (defer)    Assessment   Body structures, Functions, Activity limitations: Decreased functional mobility ; Decreased strength;Decreased cognition;Decreased ROM; Decreased safe awareness;Decreased balance  Assessment: 69 y.o. female w/multiple hemorrhagic brain mets with vasogenic edema and brain compression. Pt currently requiring min A for transfers and amb with use of RW. Pt with 3 near LOB during amb with RW requiring Min A to correct. Pt with dementia, has decreased safety awareness and impaired balance making her a high risk of falls. Pt is below her baseline and will require further skilled PT to maximize safety and independence with functional mobility. Anticipate continued IP therapy at d/c. Will continue to follow. Treatment Diagnosis: Decreased functional mobility and balance  Prognosis: Fair;Good  Decision Making: Medium Complexity  Patient Education: role of PT, use of call light, d/c planning; pt verb understanding however rec reinforcement 2/2 to dementia  Barriers to Learning: dementia   REQUIRES PT FOLLOW UP: Yes  Activity Tolerance  Activity Tolerance: Patient Tolerated treatment well       Patient Diagnosis(es): There were no encounter diagnoses.      has a past medical history of Arthritis, Dementia, Depression, Eczema, Hyperlipidemia, home alone Tues-Thurs when  is at work    Objective  AROM RLE (degrees)  RLE General AROM: adequate for functional mobility   AROM LLE (degrees)  LLE General AROM: Adequate for functional mobility   Strength RLE  Comment: Globally 4+/5  Strength LLE  Comment: Globally 4+/5        Bed mobility  Supine to Sit: Stand by assistance(HOB raised)  Scooting: Stand by assistance(to EOB )  Transfers  Sit to Stand: Minimal Assistance(from EOB, from toilet)  Stand to sit: Minimal Assistance(to toilet, to recliner)  Comment: VC for hand placement, sequencing and safety. Ambulation  Ambulation?: Yes  Ambulation 1  Surface: level tile  Device: Rolling Walker  Quality of Gait: moderate pedro and stride length, narrow Nidia with scizzoring at times - Pt with 3 near LOB requiring min A to correct balance. Distance: 100' +20'   Comments: Pt requiring VC/tactile cues to keep L hand on RW at times 2/2 to decreased sensation and neuromuscular control/weakness. Pt requiring assistance with RW managemnt at times especially in tighter spaces (bathroom) and with turns. Stairs/Curb  Stairs?: No     Balance  Posture: Fair  Sitting - Static: Good  Sitting - Dynamic: Good  Standing - Static: Fair;+  Standing - Dynamic: Fair(with RW and Min A with 3 near LOB )      PT evaluation and treatment initiated. Treatment included gait and transfer training as well as patient education.     Plan   Plan  Times per week: 5-7  Times per day: Daily  Current Treatment Recommendations: Strengthening, Transfer Training, Balance Training, Endurance Training, Gait Training, ROM, Stair training, Functional Mobility Training, Home Exercise Program, Safety Education & Training  Safety Devices  Type of devices: Gait belt, Left in chair, Chair alarm in place, Call light within reach, Nurse notified    AM-PAC Score  AM-PAC Inpatient Mobility Raw Score : 17 (08/23/19 1543)  AM-PAC Inpatient T-Scale Score : 42.13 (08/23/19 1543)  Mobility Inpatient CMS 0-100%

## 2019-08-23 NOTE — PROGRESS NOTES
Date: 8/21/2019  Large left apical mass which is difficult to separate from adjacent consolidation but likely measures up to 6.8 cm. Extensive mediastinal and axillary lymphadenopathy. Multiple sites are amenable to percutaneous biopsy, though axillary lymphadenopathy may be easiest. Trace left pleural effusion. Mri Brain Wo Contrast  Result Date: 8/23/2019  Multiple scattered focal lesions in the supratentorial and infratentorial brain with perilesional edema consistent with metastasis. Labs:  Recent Labs     08/23/19  0529   WBC 10.5   HGB 11.9*   HCT 35.9*          Recent Labs     08/23/19  0529   *   K 4.5      CO2 21   BUN 27*   CREATININE 1.0   GLUCOSE 177*   CALCIUM 8.6       Recent Labs     08/22/19  1511   PROTIME 11.8   INR 1.04       Patient Active Problem List    Diagnosis Date Noted    Goals of care, counseling/discussion     Encounter for palliative care     Metastatic lung carcinoma, unspecified laterality ([facility] [address] 08/21/2019    Age-related osteoporosis without current pathological fracture 06/17/2019    Primary insomnia 04/29/2019    Encephalopathy 04/06/2019    Finger dislocation, initial encounter 03/11/2019    Dyslipidemia 10/29/2018    Late onset Alzheimer's disease with behavioral disturbance 09/07/2017    Essential hypertension 02/02/2015    Dysthymia 02/02/2015    Alzheimer's disease 02/02/2015    Tobacco use 08/16/2013    Hyperlipidemia 05/08/2013    OA (osteoarthritis) 05/08/2013     Assessment:  Patient is a 69 y.o. female w/multiple hemorrhagic brain mets with vasogenic edema and brain compression.     Plan:  1. Neurologically stable  2. Neurologic exams frequency:  - Floor: Q4H until otherwise directed  3. For change in exam MUST contact neurosurgery team along with critical care or primary team  4.  Brain Mets:  - MRI Brain revealed multiple scattered focal lesions in the supratentorial and infratentorial brain with perilesional edema consistent

## 2019-08-23 NOTE — PROGRESS NOTES
Occupational Therapy   Occupational Therapy Initial Assessment/Treatment  Date: 2019   Patient Name: José Miguel Murguia  MRN: [id number]     : 1945    Date of Service: 2019    Discharge Recommendations:  José Miguel Murguia scored a 14/24 on the AM-PAC ADL Inpatient form. Current research shows that an AM-PAC score of 17 or less is typically not associated with a discharge to the patient's home setting. Based on the patients AM-PAC score and their current ADL deficits, it is recommended that the patient have 3-5 sessions per week of Occupational Therapy at d/c to increase the patients independence. See assessment below         OT Equipment Recommendations  Equipment Needed: No    Assessment   Performance deficits / Impairments: Decreased functional mobility ; Decreased cognition;Decreased ADL status; Decreased strength;Decreased balance;Decreased safe awareness;Decreased fine motor control;Decreased coordination  Assessment: Pt has dementia and requires assist for bathing/dressing at baseline but is typically independent with mobility. Pt currently demo weakness and coordination deficits of L UE along with impaired balance and poor safety awareness/insight. Pt requires assistance for all ADLs and mobility. Pt would benefit from ongoing OT Tx upon d/c. Pt's  unsure of d/c plan \"This is all happening so fast\".  states hope to have better idea of pt's d/c needs pending biopsy results on Monday. Will follow as inpt. Pt would benefit from ongoing OT tx upon d/c- either rehab or [address] [place] [address] with 24hr assist from family. Treatment Diagnosis: Impaired ADLs, balance, mobility, L UE strenght and coordination related to brain mets  Prognosis: Fair  Decision Making: Medium Complexity  OT Education: OT Role;Plan of Care;Orientation; Family Education; ADL Adaptive Strategies;Transfer Training  Patient Education: discussed w/  pt's current level of function, home safety concerns, recs for safe ADLs, POC-  day: Daily    AM-PAC Score        AM-PAC Inpatient Daily Activity Raw Score: 14 (08/23/19 1654)  AM-PAC Inpatient ADL T-Scale Score : 33.39 (08/23/19 1654)  ADL Inpatient CMS 0-100% Score: 59.67 (08/23/19 1654)  ADL Inpatient CMS G-Code Modifier : CK (08/23/19 1654)    Goals  Short term goals  Time Frame for Short term goals: by discharge  Short term goal 1: sba toilet transfer using LRAD and no VC needed for safety  Short term goal 2: cga LB dressing  Short term goal 3: pt demo HEP for L UE strengthening/coordination with supervision/VC  Short term goal 4: sba standing balance at sink for grooming tasks       Therapy Time   Individual Concurrent Group Co-treatment   Time In 1423         Time Out 1507         Minutes 44         Timed Code Treatment Minutes:   29  Total Treatment Minutes:  40     If patient is d/c prior to next treatment session, this note will serve as the discharge summary    [facility] OTR/L, [address], OT

## 2019-08-24 LAB
ANION GAP SERPL CALCULATED.3IONS-SCNC: 12 MMOL/L (ref 3–16)
BASOPHILS ABSOLUTE: 0 K/UL (ref 0–0.2)
BASOPHILS RELATIVE PERCENT: 0.2 %
BUN BLDV-MCNC: 24 MG/DL (ref 7–20)
CALCIUM SERPL-MCNC: 8.6 MG/DL (ref 8.3–10.6)
CHLORIDE BLD-SCNC: 100 MMOL/L (ref 99–110)
CO2: 22 MMOL/L (ref 21–32)
CREAT SERPL-MCNC: 0.9 MG/DL (ref 0.6–1.2)
EOSINOPHILS ABSOLUTE: 0 K/UL (ref 0–0.6)
EOSINOPHILS RELATIVE PERCENT: 0 %
GFR AFRICAN AMERICAN: >60
GFR NON-AFRICAN AMERICAN: >60
GLUCOSE BLD-MCNC: 180 MG/DL (ref 70–99)
GLUCOSE BLD-MCNC: 184 MG/DL (ref 70–99)
GLUCOSE BLD-MCNC: 191 MG/DL (ref 70–99)
GLUCOSE BLD-MCNC: 195 MG/DL (ref 70–99)
GLUCOSE BLD-MCNC: 198 MG/DL (ref 70–99)
HCT VFR BLD CALC: 36.1 % (ref 36–48)
HEMOGLOBIN: 11.9 G/DL (ref 12–16)
LYMPHOCYTES ABSOLUTE: 1.2 K/UL (ref 1–5.1)
LYMPHOCYTES RELATIVE PERCENT: 9.9 %
MCH RBC QN AUTO: 29.5 PG (ref 26–34)
MCHC RBC AUTO-ENTMCNC: 33 G/DL (ref 31–36)
MCV RBC AUTO: 89.3 FL (ref 80–100)
MONOCYTES ABSOLUTE: 0.3 K/UL (ref 0–1.3)
MONOCYTES RELATIVE PERCENT: 2.8 %
NEUTROPHILS ABSOLUTE: 10.8 K/UL (ref 1.7–7.7)
NEUTROPHILS RELATIVE PERCENT: 87.1 %
PDW BLD-RTO: 15.2 % (ref 12.4–15.4)
PERFORMED ON: ABNORMAL
PLATELET # BLD: 354 K/UL (ref 135–450)
PMV BLD AUTO: 7.3 FL (ref 5–10.5)
POTASSIUM REFLEX MAGNESIUM: 4.1 MMOL/L (ref 3.5–5.1)
RBC # BLD: 4.05 M/UL (ref 4–5.2)
SODIUM BLD-SCNC: 134 MMOL/L (ref 136–145)
WBC # BLD: 12.4 K/UL (ref 4–11)

## 2019-08-24 PROCEDURE — 1200000000 HC SEMI PRIVATE

## 2019-08-24 PROCEDURE — 6360000002 HC RX W HCPCS: Performed by: STUDENT IN AN ORGANIZED HEALTH CARE EDUCATION/TRAINING PROGRAM

## 2019-08-24 PROCEDURE — 2580000003 HC RX 258: Performed by: STUDENT IN AN ORGANIZED HEALTH CARE EDUCATION/TRAINING PROGRAM

## 2019-08-24 PROCEDURE — 97116 GAIT TRAINING THERAPY: CPT

## 2019-08-24 PROCEDURE — 36415 COLL VENOUS BLD VENIPUNCTURE: CPT

## 2019-08-24 PROCEDURE — 85025 COMPLETE CBC W/AUTO DIFF WBC: CPT

## 2019-08-24 PROCEDURE — 97530 THERAPEUTIC ACTIVITIES: CPT

## 2019-08-24 PROCEDURE — 80048 BASIC METABOLIC PNL TOTAL CA: CPT

## 2019-08-24 PROCEDURE — 6370000000 HC RX 637 (ALT 250 FOR IP): Performed by: STUDENT IN AN ORGANIZED HEALTH CARE EDUCATION/TRAINING PROGRAM

## 2019-08-24 RX ADMIN — DEXAMETHASONE 4 MG: 4 TABLET ORAL at 03:18

## 2019-08-24 RX ADMIN — MEMANTINE HYDROCHLORIDE 5 MG: 5 TABLET ORAL at 20:52

## 2019-08-24 RX ADMIN — Medication 10 ML: at 20:57

## 2019-08-24 RX ADMIN — SODIUM CHLORIDE: 9 INJECTION, SOLUTION INTRAVENOUS at 00:35

## 2019-08-24 RX ADMIN — LISINOPRIL 20 MG: 20 TABLET ORAL at 08:52

## 2019-08-24 RX ADMIN — SENNOSIDES 8.6 MG: 8.6 TABLET, FILM COATED ORAL at 20:52

## 2019-08-24 RX ADMIN — PANTOPRAZOLE SODIUM 40 MG: 20 TABLET, DELAYED RELEASE ORAL at 06:41

## 2019-08-24 RX ADMIN — LEVETIRACETAM 500 MG: 500 TABLET, FILM COATED ORAL at 08:53

## 2019-08-24 RX ADMIN — Medication 10 ML: at 08:53

## 2019-08-24 RX ADMIN — CITALOPRAM HYDROBROMIDE 20 MG: 20 TABLET ORAL at 08:52

## 2019-08-24 RX ADMIN — DONEPEZIL HYDROCHLORIDE 5 MG: 10 TABLET, FILM COATED ORAL at 20:51

## 2019-08-24 RX ADMIN — TRAZODONE HYDROCHLORIDE 25 MG: 50 TABLET ORAL at 20:52

## 2019-08-24 RX ADMIN — METOPROLOL TARTRATE 100 MG: 100 TABLET ORAL at 08:52

## 2019-08-24 RX ADMIN — INSULIN LISPRO 1 UNITS: 100 INJECTION, SOLUTION INTRAVENOUS; SUBCUTANEOUS at 21:05

## 2019-08-24 RX ADMIN — LEVETIRACETAM 500 MG: 500 TABLET, FILM COATED ORAL at 20:51

## 2019-08-24 RX ADMIN — SODIUM CHLORIDE: 9 INJECTION, SOLUTION INTRAVENOUS at 20:51

## 2019-08-24 RX ADMIN — SODIUM CHLORIDE: 9 INJECTION, SOLUTION INTRAVENOUS at 10:19

## 2019-08-24 RX ADMIN — DEXAMETHASONE 4 MG: 4 TABLET ORAL at 20:52

## 2019-08-24 RX ADMIN — DEXAMETHASONE 4 MG: 4 TABLET ORAL at 08:52

## 2019-08-24 RX ADMIN — DEXAMETHASONE 4 MG: 4 TABLET ORAL at 16:34

## 2019-08-24 RX ADMIN — SIMVASTATIN 40 MG: 40 TABLET, FILM COATED ORAL at 20:51

## 2019-08-24 RX ADMIN — INSULIN LISPRO 1 UNITS: 100 INJECTION, SOLUTION INTRAVENOUS; SUBCUTANEOUS at 16:35

## 2019-08-24 RX ADMIN — AMLODIPINE BESYLATE 10 MG: 10 TABLET ORAL at 08:52

## 2019-08-24 RX ADMIN — INSULIN LISPRO 1 UNITS: 100 INJECTION, SOLUTION INTRAVENOUS; SUBCUTANEOUS at 11:49

## 2019-08-24 RX ADMIN — INSULIN LISPRO 1 UNITS: 100 INJECTION, SOLUTION INTRAVENOUS; SUBCUTANEOUS at 07:46

## 2019-08-24 RX ADMIN — MEMANTINE HYDROCHLORIDE 5 MG: 5 TABLET ORAL at 08:52

## 2019-08-24 ASSESSMENT — PAIN SCALES - GENERAL
PAINLEVEL_OUTOF10: 0
PAINLEVEL_OUTOF10: 0

## 2019-08-24 NOTE — PROGRESS NOTES
Patient oriented to self only. VSS. No complaints of pain. No change in neuro status overnight. Patient urinating adequately. Tolerating ambulation fairly well. Fall precautions in place, will continue to monitor.

## 2019-08-24 NOTE — PROGRESS NOTES
Physical Therapy  Facility/Department: [facility] 5T ORTHO/NEURO  Daily Treatment Note  NAME: Jonathon Gomez  : 1945  MRN: [id number]    Date of Service: 2019    Discharge Recommendations:Abby Meyers scored a 18/24 on the AM-PAC short mobility form. Current research shows that an AM-PAC score of 18 or greater is typically associated with a discharge to the patient's home setting. Based on the patients AM-PAC score and their current functional mobility deficits, it is recommended that the patient have 2-3 sessions per week of Physical Therapy at d/c to increase the patients independence. HOME HEALTH CARE: LEVEL 1 STANDARD    - Initial home health evaluation to occur within 24-48 hours, in patient home   - Therapy to evaluate with goal of regaining prior level of functioning   - Therapy to evaluate if patient has [id number] needs for personal care        PT Equipment Recommendations  Equipment Needed: (rolling walker if goes home)    Assessment   Body structures, Functions, Activity limitations: Decreased functional mobility ; Decreased strength;Decreased cognition;Decreased ROM; Decreased safe awareness;Decreased balance  Assessment: Decreased assist for transfers/gt. Increased gt endurance. Improved stability with use of walker. Needs cues for safety with transfers/gt. Pt at risk for falls and not safe to ambulate alone or be home alone. Rec cont skilled PT to maximize mobiltiy and independence. Treatment Diagnosis: Decreased functional mobility and balance  Patient Education: role of PT, use of call light, d/c planning; pt verb understanding however rec reinforcement 2/2 to dementia  REQUIRES PT FOLLOW UP: Yes     Patient Diagnosis(es): There were no encounter diagnoses. has a past medical history of Arthritis, Dementia, Depression, Eczema, Hyperlipidemia, Hypertension, Metastatic lung carcinoma, unspecified laterality ([facility] [address] Osteoarthritis, and Osteoporosis.    has a past surgical

## 2019-08-24 NOTE — PLAN OF CARE
Problem: Falls - Risk of:  Goal: Will remain free from falls  Description  Will remain free from falls  Outcome: Ongoing   Patient has remained free of falls. 2/4 bed rails up, bed locked and in lowest position, call light within reach. Patient instructed on use of call light and uses appropriately. Bed alarm on. Non-skid footwear and fall band on. Will continue to monitor. Problem: Risk for Impaired Skin Integrity  Goal: Tissue integrity - skin and mucous membranes  Description  Structural intactness and normal physiological function of skin and  mucous membranes. Outcome: Ongoing   Patient turned and repositioned every two hours. Barrier cream used on bottom. Sacral heart on buttocks. Heels elevated off of bed. Skin thoroughly assessed. Will continue to monitor.

## 2019-08-25 LAB
ANION GAP SERPL CALCULATED.3IONS-SCNC: 11 MMOL/L (ref 3–16)
BASOPHILS ABSOLUTE: 0 K/UL (ref 0–0.2)
BASOPHILS RELATIVE PERCENT: 0.4 %
BUN BLDV-MCNC: 24 MG/DL (ref 7–20)
CALCIUM SERPL-MCNC: 8.1 MG/DL (ref 8.3–10.6)
CHLORIDE BLD-SCNC: 100 MMOL/L (ref 99–110)
CO2: 22 MMOL/L (ref 21–32)
CREAT SERPL-MCNC: 0.9 MG/DL (ref 0.6–1.2)
EOSINOPHILS ABSOLUTE: 0 K/UL (ref 0–0.6)
EOSINOPHILS RELATIVE PERCENT: 0.1 %
GFR AFRICAN AMERICAN: >60
GFR NON-AFRICAN AMERICAN: >60
GLUCOSE BLD-MCNC: 148 MG/DL (ref 70–99)
GLUCOSE BLD-MCNC: 174 MG/DL (ref 70–99)
GLUCOSE BLD-MCNC: 183 MG/DL (ref 70–99)
GLUCOSE BLD-MCNC: 212 MG/DL (ref 70–99)
GLUCOSE BLD-MCNC: 285 MG/DL (ref 70–99)
HCT VFR BLD CALC: 36.1 % (ref 36–48)
HEMOGLOBIN: 11.9 G/DL (ref 12–16)
LYMPHOCYTES ABSOLUTE: 1.1 K/UL (ref 1–5.1)
LYMPHOCYTES RELATIVE PERCENT: 10.8 %
MCH RBC QN AUTO: 29.6 PG (ref 26–34)
MCHC RBC AUTO-ENTMCNC: 33 G/DL (ref 31–36)
MCV RBC AUTO: 89.7 FL (ref 80–100)
MONOCYTES ABSOLUTE: 0.4 K/UL (ref 0–1.3)
MONOCYTES RELATIVE PERCENT: 4.1 %
NEUTROPHILS ABSOLUTE: 8.9 K/UL (ref 1.7–7.7)
NEUTROPHILS RELATIVE PERCENT: 84.6 %
PDW BLD-RTO: 15.3 % (ref 12.4–15.4)
PERFORMED ON: ABNORMAL
PLATELET # BLD: 329 K/UL (ref 135–450)
PMV BLD AUTO: 7.7 FL (ref 5–10.5)
POTASSIUM REFLEX MAGNESIUM: 4.1 MMOL/L (ref 3.5–5.1)
RBC # BLD: 4.03 M/UL (ref 4–5.2)
SODIUM BLD-SCNC: 133 MMOL/L (ref 136–145)
WBC # BLD: 10.5 K/UL (ref 4–11)

## 2019-08-25 PROCEDURE — 97530 THERAPEUTIC ACTIVITIES: CPT

## 2019-08-25 PROCEDURE — 6370000000 HC RX 637 (ALT 250 FOR IP): Performed by: STUDENT IN AN ORGANIZED HEALTH CARE EDUCATION/TRAINING PROGRAM

## 2019-08-25 PROCEDURE — 97535 SELF CARE MNGMENT TRAINING: CPT

## 2019-08-25 PROCEDURE — 85025 COMPLETE CBC W/AUTO DIFF WBC: CPT

## 2019-08-25 PROCEDURE — 1200000000 HC SEMI PRIVATE

## 2019-08-25 PROCEDURE — 6360000002 HC RX W HCPCS: Performed by: STUDENT IN AN ORGANIZED HEALTH CARE EDUCATION/TRAINING PROGRAM

## 2019-08-25 PROCEDURE — 2580000003 HC RX 258: Performed by: STUDENT IN AN ORGANIZED HEALTH CARE EDUCATION/TRAINING PROGRAM

## 2019-08-25 PROCEDURE — 97110 THERAPEUTIC EXERCISES: CPT

## 2019-08-25 PROCEDURE — 36415 COLL VENOUS BLD VENIPUNCTURE: CPT

## 2019-08-25 PROCEDURE — 97116 GAIT TRAINING THERAPY: CPT

## 2019-08-25 PROCEDURE — 80048 BASIC METABOLIC PNL TOTAL CA: CPT

## 2019-08-25 RX ADMIN — LEVETIRACETAM 500 MG: 500 TABLET, FILM COATED ORAL at 19:53

## 2019-08-25 RX ADMIN — AMLODIPINE BESYLATE 10 MG: 10 TABLET ORAL at 08:30

## 2019-08-25 RX ADMIN — TRAZODONE HYDROCHLORIDE 25 MG: 50 TABLET ORAL at 19:53

## 2019-08-25 RX ADMIN — INSULIN LISPRO 2 UNITS: 100 INJECTION, SOLUTION INTRAVENOUS; SUBCUTANEOUS at 19:57

## 2019-08-25 RX ADMIN — INSULIN LISPRO 2 UNITS: 100 INJECTION, SOLUTION INTRAVENOUS; SUBCUTANEOUS at 12:08

## 2019-08-25 RX ADMIN — Medication 10 ML: at 19:54

## 2019-08-25 RX ADMIN — MEMANTINE HYDROCHLORIDE 5 MG: 5 TABLET ORAL at 08:30

## 2019-08-25 RX ADMIN — SENNOSIDES 8.6 MG: 8.6 TABLET, FILM COATED ORAL at 19:53

## 2019-08-25 RX ADMIN — PANTOPRAZOLE SODIUM 40 MG: 20 TABLET, DELAYED RELEASE ORAL at 06:28

## 2019-08-25 RX ADMIN — SODIUM CHLORIDE: 9 INJECTION, SOLUTION INTRAVENOUS at 16:48

## 2019-08-25 RX ADMIN — LEVETIRACETAM 500 MG: 500 TABLET, FILM COATED ORAL at 08:30

## 2019-08-25 RX ADMIN — INSULIN LISPRO 1 UNITS: 100 INJECTION, SOLUTION INTRAVENOUS; SUBCUTANEOUS at 08:30

## 2019-08-25 RX ADMIN — MEMANTINE HYDROCHLORIDE 5 MG: 5 TABLET ORAL at 19:53

## 2019-08-25 RX ADMIN — DEXAMETHASONE 4 MG: 4 TABLET ORAL at 08:30

## 2019-08-25 RX ADMIN — METOPROLOL TARTRATE 100 MG: 100 TABLET ORAL at 08:30

## 2019-08-25 RX ADMIN — SIMVASTATIN 40 MG: 40 TABLET, FILM COATED ORAL at 19:53

## 2019-08-25 RX ADMIN — CITALOPRAM HYDROBROMIDE 20 MG: 20 TABLET ORAL at 08:30

## 2019-08-25 RX ADMIN — DEXAMETHASONE 4 MG: 4 TABLET ORAL at 03:16

## 2019-08-25 RX ADMIN — DEXAMETHASONE 4 MG: 4 TABLET ORAL at 19:53

## 2019-08-25 RX ADMIN — DEXAMETHASONE 4 MG: 4 TABLET ORAL at 14:19

## 2019-08-25 RX ADMIN — LISINOPRIL 20 MG: 20 TABLET ORAL at 08:30

## 2019-08-25 RX ADMIN — INSULIN LISPRO 1 UNITS: 100 INJECTION, SOLUTION INTRAVENOUS; SUBCUTANEOUS at 16:34

## 2019-08-25 RX ADMIN — DONEPEZIL HYDROCHLORIDE 5 MG: 10 TABLET, FILM COATED ORAL at 19:53

## 2019-08-25 RX ADMIN — SODIUM CHLORIDE: 9 INJECTION, SOLUTION INTRAVENOUS at 06:28

## 2019-08-25 ASSESSMENT — PAIN SCALES - GENERAL
PAINLEVEL_OUTOF10: 0
PAINLEVEL_OUTOF10: 0

## 2019-08-25 NOTE — PROGRESS NOTES
awareness)  Ambulation  Ambulation?: Yes  Ambulation 1  Surface: level tile  Device: Rolling Walker  Assistance: Contact guard assistance;Minimal assistance(CGA in open areas with intemittent cues for left sided awareness and hand placement; min A for confined spaces and turns)  Quality of Gait: wide base of support, increased lateral sway, decreased (B) foot clearance, heel strike, and step length  Distance: 175ft x 2  Comments: seated rest in between trials  Stairs/Curb  Stairs?: No     Balance  Posture: Fair(forward flexed, rounded shoulders, forward head)  Sitting - Static: Good  Sitting - Dynamic: Good  Standing - Static: Fair;+  Standing - Dynamic: Fair        AM-PAC Score  AM-PAC Inpatient Mobility Raw Score : 18 (08/25/19 1035)  AM-PAC Inpatient T-Scale Score : 43.63 (08/25/19 1035)  Mobility Inpatient CMS 0-100% Score: 46.58 (08/25/19 1035)  Mobility Inpatient CMS G-Code Modifier : CK (08/25/19 1035)          Goals  Short term goals  Time Frame for Short term goals: d/c - all goals ongoing 8/25  Short term goal 1: sup<>sit independent. Ongoing  Short term goal 2: sit<>stand supervision with RW. Ongoing  Short term goal 3: amb 150' with RW and supervision . Ongoing  Short term goal 4: ascend/descend 2 stairs without HR, with CGA (if d/c home).   Ongoing  Patient Goals   Patient goals : per daughter Giselle Weaver go home with 24 hour (A) when able\"    Plan    Plan  Times per week: 5-7  Times per day: Daily  Current Treatment Recommendations: Strengthening, Transfer Training, Balance Training, Endurance Training, Gait Training, ROM, Stair training, Functional Mobility Training, Home Exercise Program, Safety Education & Training, Patient/Caregiver Education & Training, Equipment Evaluation, Education, & procurement, Neuromuscular Re-education  Safety Devices  Type of devices: Call light within reach, Chair alarm in place, Gait belt, Left in chair, Nurse notified     Therapy Time   Individual Concurrent Group

## 2019-08-25 NOTE — PROGRESS NOTES
72 hours. Invalid input(s): LDLCALCU    ABGs: No results found for: PHART, IOP6UBY, PO2ART    INR:   Recent Labs     08/22/19  1511   INR 1.04       U/A:No results for input(s): NITRITE, COLORU, PHUR, LABCAST, WBCUA, RBCUA, MUCUS, TRICHOMONAS, YEAST, BACTERIA, CLARITYU, SPECGRAV, LEUKOCYTESUR, UROBILINOGEN, BILIRUBINUR, BLOODU, GLUCOSEU, AMORPHOUS in the last 72 hours. Invalid input(s): Irma Spurr   -----------------------------------------------------------------  RAD:   US BREAST BIOPSY W LOC DEVICE 1ST LESION LEFT   Final Result      Uneventful core needle biopsy left breast      US BREAST LIMITED LEFT   Final Result      Left breast mass with suspected axillary node metastasis      MRI BRAIN WO CONTRAST   Final Result      Multiple scattered focal lesions in the supratentorial and infratentorial brain with perilesional edema consistent with metastasis. CT ABDOMEN PELVIS W IV CONTRAST Additional Contrast? None   Final Result      1. New verona hepatis, portacaval, and left inguinal lymphadenopathy consistent with metastatic disease. 2. New right paracolic nodule consistent with metastatic disease. 3. Left axillary metastatic lymphadenopathy also visualized from the CT chest from 8/21/2019 and incompletely included in field-of-view limiting comparison for change in size. 4. Posterior mediastinal metastatic lymphadenopathy incompletely included in the field-of-view. 5. Small left pleural effusion. 6. Irregularity of the endplates of the lower lumbar spine, favored to be related to degenerative remodeling. Assessment/Plan:     69 yof with pertinent history of fairly advanced Alzheimer's dementia, apical left lung mass with multiple metastases to brain with progressively worsening left upper extremity weakness that started 8/19.     Metastatic lung carcinoma:- Long-term smoker.  CT abdomen and pelvis: new verona hepatis, portacaval, and left inguinal lymphadenopathy, new

## 2019-08-25 NOTE — PROGRESS NOTES
Patient currently resting in chair at bedside. Family member present also. Patient alert and oriented but forgetful. Easily re-oriented. Chair alarm on and fall precautions taken. Patient in no distress. Nurse will continue to monitor. Call light within reach.  [facility]

## 2019-08-25 NOTE — PROGRESS NOTES
Comment: pt has dementia at baseline and needing ongoing cues to attend to L side / use of walker / safe ambulation. Type of ROM/Therapeutic Exercise  Type of ROM/Therapeutic Exercise: AROM  Comment: Pt edu on 3-4 LUE exercises wrist / elbow / shoulder and hand AROM -- pt needing mod v.cues and demo to perform. pt demo ongoing slight drift clumsiness of LUE throughout      Plan  This note will serve as a discharge summary if patient is discharged from hospital before next treatment session.     Plan  Times per week: 5-7x  Times per day: Daily  Current Treatment Recommendations: Functional Mobility Training, Endurance Training, Equipment Evaluation, Education, & procurement, Patient/Caregiver Education & Training, Self-Care / ADL, Safety Education & Training    AM-PAC Score  AM-PAC Inpatient Daily Activity Raw Score: 16 (08/25/19 0941)  AM-PAC Inpatient ADL T-Scale Score : 35.96 (08/25/19 0941)  ADL Inpatient CMS 0-100% Score: 53.32 (08/25/19 0941)  ADL Inpatient CMS G-Code Modifier : CK (08/25/19 0941)    Goals  Short term goals  Time Frame for Short term goals: by discharge  Short term goal 1: sba toilet transfer using LRAD and no VC needed for safety - not met   Short term goal 2: cga LB dressing- not met   Short term goal 3: pt demo HEP for L UE strengthening/coordination with supervision/VC - not met  Short term goal 4: sba standing balance at sink for grooming tasks-part met      Therapy Time   Individual Concurrent Group Co-treatment   Time In 0855         Time Out 0950         Minutes 55              Timed Code Treatment Minutes:  55 mins     Total Treatment Minutes:  55 mins       Eun Galo OT

## 2019-08-26 ENCOUNTER — PROCEDURE VISIT (OUTPATIENT)
Dept: RADIATION ONCOLOGY | Age: 74
End: 2019-08-26

## 2019-08-26 DIAGNOSIS — G93.9 BRAIN DAMAGE: Primary | ICD-10-CM

## 2019-08-26 LAB
ANION GAP SERPL CALCULATED.3IONS-SCNC: 11 MMOL/L (ref 3–16)
BASOPHILS ABSOLUTE: 0 K/UL (ref 0–0.2)
BASOPHILS RELATIVE PERCENT: 0.1 %
BUN BLDV-MCNC: 30 MG/DL (ref 7–20)
CALCIUM SERPL-MCNC: 8.4 MG/DL (ref 8.3–10.6)
CHLORIDE BLD-SCNC: 99 MMOL/L (ref 99–110)
CO2: 22 MMOL/L (ref 21–32)
CREAT SERPL-MCNC: 1 MG/DL (ref 0.6–1.2)
EOSINOPHILS ABSOLUTE: 0 K/UL (ref 0–0.6)
EOSINOPHILS RELATIVE PERCENT: 0 %
GFR AFRICAN AMERICAN: >60
GFR NON-AFRICAN AMERICAN: 54
GLUCOSE BLD-MCNC: 177 MG/DL (ref 70–99)
GLUCOSE BLD-MCNC: 183 MG/DL (ref 70–99)
GLUCOSE BLD-MCNC: 189 MG/DL (ref 70–99)
GLUCOSE BLD-MCNC: 203 MG/DL (ref 70–99)
GLUCOSE BLD-MCNC: 230 MG/DL (ref 70–99)
HCT VFR BLD CALC: 36.8 % (ref 36–48)
HEMOGLOBIN: 12.4 G/DL (ref 12–16)
LYMPHOCYTES ABSOLUTE: 1.2 K/UL (ref 1–5.1)
LYMPHOCYTES RELATIVE PERCENT: 13.1 %
MCH RBC QN AUTO: 29.9 PG (ref 26–34)
MCHC RBC AUTO-ENTMCNC: 33.7 G/DL (ref 31–36)
MCV RBC AUTO: 88.6 FL (ref 80–100)
MONOCYTES ABSOLUTE: 0.5 K/UL (ref 0–1.3)
MONOCYTES RELATIVE PERCENT: 4.8 %
NEUTROPHILS ABSOLUTE: 7.7 K/UL (ref 1.7–7.7)
NEUTROPHILS RELATIVE PERCENT: 82 %
PDW BLD-RTO: 15 % (ref 12.4–15.4)
PERFORMED ON: ABNORMAL
PLATELET # BLD: 338 K/UL (ref 135–450)
PMV BLD AUTO: 7.5 FL (ref 5–10.5)
POTASSIUM REFLEX MAGNESIUM: 4.6 MMOL/L (ref 3.5–5.1)
RBC # BLD: 4.16 M/UL (ref 4–5.2)
SODIUM BLD-SCNC: 132 MMOL/L (ref 136–145)
WBC # BLD: 9.4 K/UL (ref 4–11)

## 2019-08-26 PROCEDURE — 97530 THERAPEUTIC ACTIVITIES: CPT

## 2019-08-26 PROCEDURE — 6360000002 HC RX W HCPCS: Performed by: STUDENT IN AN ORGANIZED HEALTH CARE EDUCATION/TRAINING PROGRAM

## 2019-08-26 PROCEDURE — 85025 COMPLETE CBC W/AUTO DIFF WBC: CPT

## 2019-08-26 PROCEDURE — 97535 SELF CARE MNGMENT TRAINING: CPT

## 2019-08-26 PROCEDURE — 36415 COLL VENOUS BLD VENIPUNCTURE: CPT

## 2019-08-26 PROCEDURE — 6370000000 HC RX 637 (ALT 250 FOR IP): Performed by: STUDENT IN AN ORGANIZED HEALTH CARE EDUCATION/TRAINING PROGRAM

## 2019-08-26 PROCEDURE — 1200000000 HC SEMI PRIVATE

## 2019-08-26 PROCEDURE — 97116 GAIT TRAINING THERAPY: CPT

## 2019-08-26 PROCEDURE — 80048 BASIC METABOLIC PNL TOTAL CA: CPT

## 2019-08-26 PROCEDURE — 77334 RADIATION TREATMENT AID(S): CPT

## 2019-08-26 PROCEDURE — 2580000003 HC RX 258: Performed by: STUDENT IN AN ORGANIZED HEALTH CARE EDUCATION/TRAINING PROGRAM

## 2019-08-26 PROCEDURE — 77290 THER RAD SIMULAJ FIELD CPLX: CPT

## 2019-08-26 RX ADMIN — LISINOPRIL 20 MG: 20 TABLET ORAL at 09:08

## 2019-08-26 RX ADMIN — DEXAMETHASONE 4 MG: 4 TABLET ORAL at 15:02

## 2019-08-26 RX ADMIN — DEXAMETHASONE 4 MG: 4 TABLET ORAL at 20:24

## 2019-08-26 RX ADMIN — INSULIN LISPRO 2 UNITS: 100 INJECTION, SOLUTION INTRAVENOUS; SUBCUTANEOUS at 20:26

## 2019-08-26 RX ADMIN — DONEPEZIL HYDROCHLORIDE 5 MG: 10 TABLET, FILM COATED ORAL at 20:23

## 2019-08-26 RX ADMIN — LEVETIRACETAM 500 MG: 500 TABLET, FILM COATED ORAL at 09:07

## 2019-08-26 RX ADMIN — INSULIN LISPRO 2 UNITS: 100 INJECTION, SOLUTION INTRAVENOUS; SUBCUTANEOUS at 17:02

## 2019-08-26 RX ADMIN — TRAZODONE HYDROCHLORIDE 25 MG: 50 TABLET ORAL at 20:23

## 2019-08-26 RX ADMIN — DEXAMETHASONE 4 MG: 4 TABLET ORAL at 03:35

## 2019-08-26 RX ADMIN — SIMVASTATIN 40 MG: 40 TABLET, FILM COATED ORAL at 20:24

## 2019-08-26 RX ADMIN — Medication 10 ML: at 09:08

## 2019-08-26 RX ADMIN — MEMANTINE HYDROCHLORIDE 5 MG: 5 TABLET ORAL at 20:23

## 2019-08-26 RX ADMIN — INSULIN LISPRO 2 UNITS: 100 INJECTION, SOLUTION INTRAVENOUS; SUBCUTANEOUS at 13:01

## 2019-08-26 RX ADMIN — DEXAMETHASONE 4 MG: 4 TABLET ORAL at 09:07

## 2019-08-26 RX ADMIN — AMLODIPINE BESYLATE 10 MG: 10 TABLET ORAL at 09:07

## 2019-08-26 RX ADMIN — Medication 10 ML: at 20:24

## 2019-08-26 RX ADMIN — PANTOPRAZOLE SODIUM 40 MG: 20 TABLET, DELAYED RELEASE ORAL at 09:07

## 2019-08-26 RX ADMIN — MEMANTINE HYDROCHLORIDE 5 MG: 5 TABLET ORAL at 09:07

## 2019-08-26 RX ADMIN — SENNOSIDES 8.6 MG: 8.6 TABLET, FILM COATED ORAL at 20:23

## 2019-08-26 RX ADMIN — METOPROLOL TARTRATE 100 MG: 100 TABLET ORAL at 09:07

## 2019-08-26 RX ADMIN — CITALOPRAM HYDROBROMIDE 20 MG: 20 TABLET ORAL at 09:07

## 2019-08-26 RX ADMIN — LEVETIRACETAM 500 MG: 500 TABLET, FILM COATED ORAL at 20:23

## 2019-08-26 ASSESSMENT — PAIN SCALES - GENERAL
PAINLEVEL_OUTOF10: 0

## 2019-08-26 NOTE — PLAN OF CARE
Problem: Falls - Risk of:  Goal: Will remain free from falls  Description  Will remain free from falls  Outcome: Ongoing   Pt. Has been free from falls this hospital stay. Assist X1 with rolling walker. Tolerates well. Fall precautions in place. Problem: Pain:  Goal: Patient's pain/discomfort is manageable  Description  Patient's pain/discomfort is manageable  Outcome: Ongoing   Pt. States no pain or discomfort.

## 2019-08-26 NOTE — CARE COORDINATION
Spoke with patient and spouse and they are agreeable to home care if it is needed at discharge. They were fine with [employer] following along and if needed they will be available. Pt has no DME needs at d/c. [facility] will also provide services.      Electronically signed by Nabor Elaine RN on 8/26/2019 at 12:16 PM  [phone]

## 2019-08-26 NOTE — PROGRESS NOTES
for input(s): CHOL, HDL in the last 72 hours. Invalid input(s): LDLCALCU    ABGs: No results found for: PHART, KKL7AVQ, PO2ART    INR: No results for input(s): INR in the last 72 hours. U/A:No results for input(s): NITRITE, COLORU, PHUR, LABCAST, WBCUA, RBCUA, MUCUS, TRICHOMONAS, YEAST, BACTERIA, CLARITYU, SPECGRAV, LEUKOCYTESUR, UROBILINOGEN, BILIRUBINUR, BLOODU, GLUCOSEU, AMORPHOUS in the last 72 hours. Invalid input(s): Bandar Santiago   -----------------------------------------------------------------  RAD:   US BREAST BIOPSY W LOC DEVICE 1ST LESION LEFT   Final Result      Uneventful core needle biopsy left breast      US BREAST LIMITED LEFT   Final Result      Left breast mass with suspected axillary node metastasis      MRI BRAIN WO CONTRAST   Final Result      Multiple scattered focal lesions in the supratentorial and infratentorial brain with perilesional edema consistent with metastasis. CT ABDOMEN PELVIS W IV CONTRAST Additional Contrast? None   Final Result      1. New verona hepatis, portacaval, and left inguinal lymphadenopathy consistent with metastatic disease. 2. New right paracolic nodule consistent with metastatic disease. 3. Left axillary metastatic lymphadenopathy also visualized from the CT chest from 8/21/2019 and incompletely included in field-of-view limiting comparison for change in size. 4. Posterior mediastinal metastatic lymphadenopathy incompletely included in the field-of-view. 5. Small left pleural effusion. 6. Irregularity of the endplates of the lower lumbar spine, favored to be related to degenerative remodeling. Assessment/Plan:   69 yof with pertinent history of fairly advanced Alzheimer's dementia, apical left lung mass with multiple metastases to brain with progressively worsening left upper extremity weakness that started 8/19.     Metastatic lung carcinoma:- Long-term smoker.  CT abdomen and pelvis: new verona hepatis, portacaval, and Supplements: Standard High Calorie Oral Supplement  DIET GENERAL;  PPx: SCD protonix  Dispo: Floor    Patient will be discussed with attending, MD Robin Red MD, PGY-1  8/26/2019  1:39 PM     I have personally seen and evaluated this patient. I discussed findings and management with the resident on 08/26/19  and agree as documented in this note. Pt feeling well. LUE strength improving. Awaiting path results to determine further plan.      Felipa Hollis  Attending Physician  Hospitalist

## 2019-08-26 NOTE — PROGRESS NOTES
Occupational Therapy  Daily Treatment Note  Patient Name: Shantell Aguirre  MRN: [id number]     Assessment: Pt demonstrates improving LUE function, though still with mild weakness especially at shoulder. Mobility is improving as well. Pt did well without walker with PT this morning, but was more unsteady due to fatigue this afternoon. Recommend continued use of rolling walker with nursing staff and at d/c. Due to impaired cognition, pt requires significant cueing during ADLs. Recommend continued OT during admit. Pt would benefit from close 24 hr A and home OT at d/c. HOME HEALTH CARE: LEVEL 1 STANDARD    - Initial home health evaluation to occur within 24-48 hours, in patient home   - Therapy to evaluate with goal of regaining prior level of functioning   - Therapy to evaluate if patient has [address] needs for personal care    Discharge Recommendations: Shantell Aguirre scored a 17/24 on the AM-PAC ADL Inpatient form. Current research shows that an AM-PAC score of 17 or less is typically not associated with a discharge to the patient's home setting. Based on the patients AM-PAC score and their current ADL deficits, it is recommended that the patient have 3-5 sessions per week of Occupational Therapy at d/c to increase the patients independence. See assessment. Equipment Needs:  No    Chart Reviewed: Yes   Restrictions/Precautions: Fall Risk(high fall risk) Other position/activity restrictions: seizure precautions, diet general   Additional Pertinent Hx: Pt presented 8/21 with L sided weakness. Imaging revealed lung mass and metastatic brain masses, Neurosx and oncology consulted  chest CT: Large left apical mass . MRI brain: multiple hemorrhagic brain mets with vasogenic edema and brain compression.  PMHx: Alzheimers, arhtritis, depression, HTN, osteoporosis    Diagnosis: metastatic lung carcinoma / hemorrhagic brain mets   Treatment Diagnosis: Impaired ADLs, balance, mobility, L UE strenght and

## 2019-08-27 LAB
ANION GAP SERPL CALCULATED.3IONS-SCNC: 12 MMOL/L (ref 3–16)
BASOPHILS ABSOLUTE: 0 K/UL (ref 0–0.2)
BASOPHILS RELATIVE PERCENT: 0.4 %
BUN BLDV-MCNC: 36 MG/DL (ref 7–20)
CALCIUM SERPL-MCNC: 8.5 MG/DL (ref 8.3–10.6)
CHLORIDE BLD-SCNC: 96 MMOL/L (ref 99–110)
CO2: 22 MMOL/L (ref 21–32)
CREAT SERPL-MCNC: 0.9 MG/DL (ref 0.6–1.2)
EOSINOPHILS ABSOLUTE: 0 K/UL (ref 0–0.6)
EOSINOPHILS RELATIVE PERCENT: 0 %
GFR AFRICAN AMERICAN: >60
GFR NON-AFRICAN AMERICAN: >60
GLUCOSE BLD-MCNC: 168 MG/DL (ref 70–99)
GLUCOSE BLD-MCNC: 207 MG/DL (ref 70–99)
GLUCOSE BLD-MCNC: 265 MG/DL (ref 70–99)
GLUCOSE BLD-MCNC: 326 MG/DL (ref 70–99)
HCT VFR BLD CALC: 38.8 % (ref 36–48)
HEMOGLOBIN: 12.9 G/DL (ref 12–16)
LYMPHOCYTES ABSOLUTE: 1.7 K/UL (ref 1–5.1)
LYMPHOCYTES RELATIVE PERCENT: 16.7 %
MCH RBC QN AUTO: 29.9 PG (ref 26–34)
MCHC RBC AUTO-ENTMCNC: 33.4 G/DL (ref 31–36)
MCV RBC AUTO: 89.6 FL (ref 80–100)
MONOCYTES ABSOLUTE: 0.5 K/UL (ref 0–1.3)
MONOCYTES RELATIVE PERCENT: 4.8 %
NEUTROPHILS ABSOLUTE: 8 K/UL (ref 1.7–7.7)
NEUTROPHILS RELATIVE PERCENT: 78.1 %
PDW BLD-RTO: 15 % (ref 12.4–15.4)
PERFORMED ON: ABNORMAL
PLATELET # BLD: 378 K/UL (ref 135–450)
PMV BLD AUTO: 7.6 FL (ref 5–10.5)
POTASSIUM REFLEX MAGNESIUM: 4.5 MMOL/L (ref 3.5–5.1)
RBC # BLD: 4.32 M/UL (ref 4–5.2)
SODIUM BLD-SCNC: 130 MMOL/L (ref 136–145)
WBC # BLD: 10.3 K/UL (ref 4–11)

## 2019-08-27 PROCEDURE — 36415 COLL VENOUS BLD VENIPUNCTURE: CPT

## 2019-08-27 PROCEDURE — 97116 GAIT TRAINING THERAPY: CPT

## 2019-08-27 PROCEDURE — 99232 SBSQ HOSP IP/OBS MODERATE 35: CPT | Performed by: INTERNAL MEDICINE

## 2019-08-27 PROCEDURE — 85025 COMPLETE CBC W/AUTO DIFF WBC: CPT

## 2019-08-27 PROCEDURE — 80048 BASIC METABOLIC PNL TOTAL CA: CPT

## 2019-08-27 PROCEDURE — 6370000000 HC RX 637 (ALT 250 FOR IP): Performed by: STUDENT IN AN ORGANIZED HEALTH CARE EDUCATION/TRAINING PROGRAM

## 2019-08-27 PROCEDURE — 97530 THERAPEUTIC ACTIVITIES: CPT

## 2019-08-27 PROCEDURE — 97535 SELF CARE MNGMENT TRAINING: CPT

## 2019-08-27 PROCEDURE — 2580000003 HC RX 258: Performed by: STUDENT IN AN ORGANIZED HEALTH CARE EDUCATION/TRAINING PROGRAM

## 2019-08-27 PROCEDURE — 6360000002 HC RX W HCPCS: Performed by: STUDENT IN AN ORGANIZED HEALTH CARE EDUCATION/TRAINING PROGRAM

## 2019-08-27 PROCEDURE — 1200000000 HC SEMI PRIVATE

## 2019-08-27 PROCEDURE — 97110 THERAPEUTIC EXERCISES: CPT

## 2019-08-27 RX ADMIN — SENNOSIDES 8.6 MG: 8.6 TABLET, FILM COATED ORAL at 21:24

## 2019-08-27 RX ADMIN — DONEPEZIL HYDROCHLORIDE 5 MG: 10 TABLET, FILM COATED ORAL at 21:23

## 2019-08-27 RX ADMIN — TRAZODONE HYDROCHLORIDE 25 MG: 50 TABLET ORAL at 21:24

## 2019-08-27 RX ADMIN — CITALOPRAM HYDROBROMIDE 20 MG: 20 TABLET ORAL at 08:34

## 2019-08-27 RX ADMIN — METOPROLOL TARTRATE 100 MG: 100 TABLET ORAL at 08:34

## 2019-08-27 RX ADMIN — LEVETIRACETAM 500 MG: 500 TABLET, FILM COATED ORAL at 08:33

## 2019-08-27 RX ADMIN — SIMVASTATIN 40 MG: 40 TABLET, FILM COATED ORAL at 21:24

## 2019-08-27 RX ADMIN — PANTOPRAZOLE SODIUM 40 MG: 20 TABLET, DELAYED RELEASE ORAL at 06:20

## 2019-08-27 RX ADMIN — LEVETIRACETAM 500 MG: 500 TABLET, FILM COATED ORAL at 21:24

## 2019-08-27 RX ADMIN — LISINOPRIL 30 MG: 20 TABLET ORAL at 08:34

## 2019-08-27 RX ADMIN — INSULIN LISPRO 4 UNITS: 100 INJECTION, SOLUTION INTRAVENOUS; SUBCUTANEOUS at 09:10

## 2019-08-27 RX ADMIN — INSULIN LISPRO 3 UNITS: 100 INJECTION, SOLUTION INTRAVENOUS; SUBCUTANEOUS at 17:15

## 2019-08-27 RX ADMIN — AMLODIPINE BESYLATE 10 MG: 10 TABLET ORAL at 08:34

## 2019-08-27 RX ADMIN — MEMANTINE HYDROCHLORIDE 5 MG: 5 TABLET ORAL at 08:34

## 2019-08-27 RX ADMIN — DEXAMETHASONE 4 MG: 4 TABLET ORAL at 08:34

## 2019-08-27 RX ADMIN — Medication 10 ML: at 21:24

## 2019-08-27 RX ADMIN — DEXAMETHASONE 4 MG: 4 TABLET ORAL at 21:24

## 2019-08-27 RX ADMIN — DEXAMETHASONE 4 MG: 4 TABLET ORAL at 15:57

## 2019-08-27 RX ADMIN — Medication 10 ML: at 08:34

## 2019-08-27 RX ADMIN — INSULIN LISPRO 9 UNITS: 100 INJECTION, SOLUTION INTRAVENOUS; SUBCUTANEOUS at 12:02

## 2019-08-27 RX ADMIN — MEMANTINE HYDROCHLORIDE 5 MG: 5 TABLET ORAL at 21:24

## 2019-08-27 RX ADMIN — DEXAMETHASONE 4 MG: 4 TABLET ORAL at 02:52

## 2019-08-27 RX ADMIN — INSULIN LISPRO 6 UNITS: 100 INJECTION, SOLUTION INTRAVENOUS; SUBCUTANEOUS at 21:26

## 2019-08-27 ASSESSMENT — ENCOUNTER SYMPTOMS
GASTROINTESTINAL NEGATIVE: 1
ALLERGIC/IMMUNOLOGIC NEGATIVE: 1
EYES NEGATIVE: 1
ABDOMINAL PAIN: 0
DIARRHEA: 0
ABDOMINAL DISTENTION: 0
VOICE CHANGE: 0
CHEST TIGHTNESS: 0
NAUSEA: 0
PHOTOPHOBIA: 0
WHEEZING: 0
SHORTNESS OF BREATH: 0
VOMITING: 0
COUGH: 1

## 2019-08-27 ASSESSMENT — PAIN SCALES - GENERAL
PAINLEVEL_OUTOF10: 0

## 2019-08-27 NOTE — PROGRESS NOTES
Resident Progress Note  PGY-1    Admit Date: 2019    PCP: CHRISTINA Velez CNP                  : 1945  MRN: [id number]    Subjective: Interval History:     No acute events overnight. Patient seen at bedside this morning. 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 Dr Melissa Multani will put in order for robina cath today to general surgery with possible placement today and follow up outpatient treatment. The patient denies fevers, chills, chest pain, shortness of breath, nausea, vomiting, diarrhea, or constipation. Diet: Dietary Nutrition Supplements: Standard High Calorie Oral Supplement  DIET GENERAL;    Data:     Scheduled Meds:   insulin lispro  0-18 Units Subcutaneous TID WC    insulin lispro  0-9 Units Subcutaneous Nightly    lisinopril  30 mg Oral Daily    dexamethasone  4 mg Oral Q6H    pantoprazole  40 mg Oral QAM AC    senna  1 tablet Oral Nightly    amLODIPine  10 mg Oral Daily    citalopram  20 mg Oral Daily    metoprolol  100 mg Oral Daily    traZODone  25 mg Oral Nightly    sodium chloride flush  10 mL Intravenous 2 times per day    donepezil  5 mg Oral Nightly    memantine  5 mg Oral BID    simvastatin  40 mg Oral Nightly    levETIRAcetam  500 mg Oral BID     Continuous Infusions:   dextrose       PRN Meds:glucose, dextrose, glucagon (rDNA), dextrose, sodium chloride flush, ondansetron, polyethylene glycol, acetaminophen  I/O last 3 completed shifts: In: 630 [P.O.:630]  Out: 1050 [Urine:1050]  I/O this shift:  In: 130 [P.O.:120;  I.V.:10]  Out: -     Intake/Output Summary (Last 24 hours) at 2019 1149  Last data filed at 2019 0957  Gross per 24 hour   Intake 640 ml   Output --   Net 640 ml       Objective:     Vitals: BP (!) 146/86   Pulse 66   Temp 98 °F (36.7 °C) (Oral)   Resp 16   Ht 5' 1\" (1.549 m)   Wt 160 lb 7.9 worsening left upper extremity weakness that started 8/19.     Metastatic lung carcinoma:- Long-term smoker. CT abdomen and pelvis: new verona hepatis, portacaval, and left inguinal lymphadenopathy, new right paracolic nodule, left axillary lymphadenopathy, posterior mediastinal metastatic lymphadenopathy, consistent with metastatic disease. U/S breast (8/23) showed left breast mass with suspected axillary node metastasis  - Large left apical mass in the lung with extensive mediastinal and axillary lymphadenopathy with multiple supra and infratentorial brain masses with some hemorrhagic conversion, with surrounding edema  - S/p left axillary lymph node biopsy-shows poorly differentiated\high-grade carcinoma with       neuroendocrine features\high-grade neuroendocrine carcinoma  - Whole brain out-pt radiation near home recommended, per radiation oncology  - Neuro checks  - IV Decadron  - IV Keppra   - Palliative care planning to speak with patient regarding code status, etc once biopsy results are ready. -F/U with Oncology if patient will start any in patient treatment.    -F/U Gen surg port -A- cath placement      Multiple brain masses with components of hemorrhage and vasogenic edema  - Presented with left upper extremity weakness  - MRI brain (8/22): multiple scattered focal lesions in the supratentorial and infratentorial brain with perilesional edema consistent with metastasis  - Continue decadron   - Continue keppra   - Staging of lung mass to dictate management/intervention  - Palliative care following  - Neuro checks q4h  - No surgical intervention at this time     Hyponatremia Corrected for Glucose 133  -mild-could be d/t metastatic dz vs poor po intake     HTN   -Continue home Norvasc 10 daily  -Continue home Lopressor 100mg daily  -Lisinopril 30mg daily     Hyperglycemia: likely from decadron  -Switched to HDSS     HLD:  -Zocor 40mg daily     Depression  -Celexa 20mg daily  -Trazadone 25mg

## 2019-08-27 NOTE — CARE COORDINATION
Awaiting biopsy results to determine the medical course. Family will take the patient to home and give 24/7 care along with [facility] and possibly home care with [facility].      Electronically signed by Mayank Riley RN on 8/27/2019 at 8:55 AM  [phone]

## 2019-08-27 NOTE — PROGRESS NOTES
Patient oriented to self and situation at times. VSS. No change in neuro status overnight. Tolerating ambulation well. Urinating adequately. Fall precautions in place, will continue to monitor.

## 2019-08-27 NOTE — PLAN OF CARE
Problem: Falls - Risk of:  Goal: Will remain free from falls  Description  Will remain free from falls  8/27/2019 0427 by Reno High RN  Outcome: Ongoing  8/26/2019 1518 by Jing Andrade RN  Outcome: Ongoing   Patient has remained free of falls. 2/4 bed rails up, bed locked and in lowest position, call light within reach. Patient instructed on use of call light and uses appropriately. Bed alarm on. Non-skid footwear and fall band on. Will continue to monitor. Problem: Risk for Impaired Skin Integrity  Goal: Tissue integrity - skin and mucous membranes  Description  Structural intactness and normal physiological function of skin and  mucous membranes. Outcome: Ongoing   Patient turned and repositioned every two hours. Barrier cream used on bottom. Sacral heart on buttocks. Heels elevated off of bed. Skin thoroughly assessed. Will continue to monitor.

## 2019-08-27 NOTE — PROGRESS NOTES
MCV 89.7 88.6 89.6     Renal:    Recent Labs     08/25/19  0627 08/26/19  0610 08/27/19  0546   * 132* 130*   K 4.1 4.6 4.5    99 96*   CO2 22 22 22   BUN 24* 30* 36*   CREATININE 0.9 1.0 0.9   GLUCOSE 183* 203* 207*   CALCIUM 8.1* 8.4 8.5   ANIONGAP 11 11 12     Hepatic:   No results for input(s): AST, ALT, BILITOT, BILIDIR, PROT, LABALBU, ALKPHOS in the last 72 hours. Troponin:   No results for input(s): TROPONINI in the last 72 hours. BNP: No results for input(s): BNP in the last 72 hours. Lipids: No results for input(s): CHOL, HDL in the last 72 hours. Invalid input(s): LDLCALCU, TRIGLYCERIDE  ABGs:  No results for input(s): PHART, KOK8HHJ, PO2ART, RDM1HVM, BEART, THGBART, V1CNTZFX, PHO1RVK in the last 72 hours. INR:   No results for input(s): INR in the last 72 hours. Lactate: No results for input(s): LACTATE in the last 72 hours. Cultures:  -----------------------------------------------------------------  RAD:   US BREAST BIOPSY W LOC DEVICE 1ST LESION LEFT   Final Result      Uneventful core needle biopsy left breast      US BREAST LIMITED LEFT   Final Result      Left breast mass with suspected axillary node metastasis      MRI BRAIN WO CONTRAST   Final Result      Multiple scattered focal lesions in the supratentorial and infratentorial brain with perilesional edema consistent with metastasis. CT ABDOMEN PELVIS W IV CONTRAST Additional Contrast? None   Final Result      1. New verona hepatis, portacaval, and left inguinal lymphadenopathy consistent with metastatic disease. 2. New right paracolic nodule consistent with metastatic disease. 3. Left axillary metastatic lymphadenopathy also visualized from the CT chest from 8/21/2019 and incompletely included in field-of-view limiting comparison for change in size. 4. Posterior mediastinal metastatic lymphadenopathy incompletely included in the field-of-view. 5. Small left pleural effusion.     6. Irregularity of the

## 2019-08-27 NOTE — PROGRESS NOTES
Port order received and patient is on the schedule for a single port placement by IR with  8/28 at 1500. Please have patient be NPO 6 hours prior and hold any blood thinners please. Any questions please call [phone].   Glo Rai  1:01 PM

## 2019-08-27 NOTE — PROGRESS NOTES
Progress note       Hospital Day:                                                          Code:Full Code  Admit Date: 8/21/2019  PCP: Sean Watters, APRN - CNP                                  CC: Small cell lung cancer with brain metastases    Interval Hx:  -no acute events overnight  -improved weakness in L arm  -US guided biopsy on 8/23; Path consistent with high-grade neuroendocrine carcinoma    HISTORY OF PRESENT ILLNESS:   Felix Burnham is a 69year old female with PMH of Alzheimer's dementia and apical left lung mass,Hilar and mediastinal adenopathy, axillary adenopathy, with multiple metastases to brain. She initially presented to [facility] on 8/21 for progressively worsening left upper extremity weakness that began on 8/19/19. Patient had multiple suspicious lesions found on head and chest CT. Oncology was consulted for possible lung cancer with mets to the brain. Presenting complaints:  - fatigue over past month; progressive  - dry cough over past month; progressive  - L arm weakness x 4 days; improving  - denied hx of cancer  - denied fever, chills, headaches, vision changes, nausea, vomiting, SOB, chest pain, abdominal pain, excessive weight loss, melena/hematochezia     Imaging:  CT chest:  · Large left apical mass which is difficult to separate from adjacent consolidation but likely measures up to 6.8 cm.  Extensive mediastinal and axillary lymphadenopathy.  Multiple sites are amenable to percutaneous biopsy, though axillary lymphadenopathy may be easiest.    CT head:  · Pattern suspicious for multiple supratentorial and infratentorial masses, some hemorrhagic.  Lung primary is suspected. Follow-up MRI with gadolinium would evaluate further.     Screening Hx:  - mammogram - last in October 2018; normal  - pap smear - unsure of last, but reported all have been normal  - Endometrial thickening on ultrasound 4/29/19; biopsy deferred  - colonoscopy 2/20/2012; diverticulosis  - hx of unknown \"belly insulin lispro  0-9 Units Subcutaneous Nightly    lisinopril  30 mg Oral Daily    dexamethasone  4 mg Oral Q6H    pantoprazole  40 mg Oral QAM AC    senna  1 tablet Oral Nightly    amLODIPine  10 mg Oral Daily    citalopram  20 mg Oral Daily    metoprolol  100 mg Oral Daily    traZODone  25 mg Oral Nightly    sodium chloride flush  10 mL Intravenous 2 times per day    donepezil  5 mg Oral Nightly    memantine  5 mg Oral BID    simvastatin  40 mg Oral Nightly    levETIRAcetam  500 mg Oral BID      Continuous Infusions:   dextrose       PRN Meds:glucose, dextrose, glucagon (rDNA), dextrose, sodium chloride flush, ondansetron, polyethylene glycol, acetaminophen    Allergies: No Known Allergies    REVIEW OF SYSTEMS:   History obtained from the patient and     Review of Systems   Constitutional: Positive for fatigue. HENT: Negative. Eyes: Negative. Respiratory: Positive for cough. Cardiovascular: Negative. Gastrointestinal: Negative. Endocrine: Negative. Genitourinary: Negative. Musculoskeletal: Negative. Skin: Negative. Allergic/Immunologic: Negative. Neurological: Positive for weakness. Hematological: Negative. Psychiatric/Behavioral: Negative. PHYSICAL EXAM:   Vitals: BP (!) 146/86   Pulse 66   Temp 98 °F (36.7 °C) (Oral)   Resp 16   Ht 5' 1\" (1.549 m)   Wt 160 lb 7.9 oz (72.8 kg)   LMP  (LMP Unknown)   SpO2 91%   BMI 30.33 kg/m²     I/O:      Intake/Output Summary (Last 24 hours) at 8/27/2019 0930  Last data filed at 8/27/2019 0834  Gross per 24 hour   Intake 520 ml   Output --   Net 520 ml     I/O this shift:  In: 10 [I.V.:10]  Out: -   I/O last 3 completed shifts: In: 56 [P.O.:630]  Out: 1050 [Urine:1050]    Physical Examination:     Physical Exam   Constitutional: She is oriented to person, place, and time. No distress. HENT:   Head: Normocephalic and atraumatic. Eyes: Pupils are equal, round, and reactive to light.  Conjunctivae and EOM  08/23/2019  Attend Phys:   Caryn Mares MD         Completed:     08/27/2019  Perform Phys: Nick Gallegos MD    FINAL DIAGNOSIS:    Left breast core biopsy:     - Involved with poorly differentiated\high-grade carcinoma with       neuroendocrine features\high-grade neuroendocrine carcinoma.     - See case comment. COMMENT:    ... The core biopsy specimens contain a malignant infiltrate  composed of a population of cells with nuclear overlap, dispersed nuclear  chromatin, mitotic activity and areas of necrosis.  The lesion shows  features of a neuroendocrine malignancy.  A panel of immunoperoxidase  stains is performed and the infiltrate is pankeratin\CAM 5.2 positive, CK  7 negative, CLAY-3 negative, TTF-1 negative,  synaptophysin diffusely  positive and chromogranin positive.  A Ki-67 stain supports a high  proliferative index with at least 90% of cells staining positive.  The  histology and staining pattern are supportive of involvement of the core  biopsy with a high-grade neuroendocrine carcinoma and the lesion appears  to reflect metastatic disease and a definite origin for the lesion cannot  be determined histologically.  The history of a concurrent lung mass is  noted.  .................... Arbutus Ring  ISENC/ISENC      Radiology:  Xr Chest Standard (2 Vw)    Result Date: 8/16/2019  EXAMINATION: TWO XRAY VIEWS OF THE CHEST 8/16/2019 3:04 pm COMPARISON: None. HISTORY: ORDERING SYSTEM PROVIDED HISTORY: SOB (shortness of breath) on exertion TECHNOLOGIST PROVIDED HISTORY: Reason for exam:->SOb with activity Reason for Exam: sob Acuity: Acute Type of Exam: Initial Additional signs and symptoms: sob FINDINGS: The cardiac silhouette is normal.  There is a left hilar opacity measuring 5 x 3 cm. It has continuity with the thoracic aorta shadow superiorly. There is left apical capping. There no pleural effusions. No acute osseous abnormality is seen.      Left hilar mass-  adenopathy, pulmonary neoplasm or saccular RECOMMENDATIONS: Follow-up MRI with gadolinium would evaluate further. Ct Chest W Contrast    Result Date: 8/21/2019  EXAMINATION: CT OF THE CHEST WITH CONTRAST 8/21/2019 2:14 pm TECHNIQUE: CT of the chest was performed with the administration of intravenous contrast. Multiplanar reformatted images are provided for review. Dose modulation, iterative reconstruction, and/or weight based adjustment of the mA/kV was utilized to reduce the radiation dose to as low as reasonably achievable. COMPARISON: None. HISTORY: ORDERING SYSTEM PROVIDED HISTORY: mass seen on CXR TECHNOLOGIST PROVIDED HISTORY: If patient is on cardiac monitor and/or pulse ox, they may be taken off cardiac monitor and pulse ox, left on O2 if currently on. All monitors reattached when patient returns to room. Reason for Exam: mass seen on CXR, left arm weakness/loss of use over the past 2-3 days Acuity: Acute Type of Exam: Initial Relevant Medical/Surgical History: no hx of surg to chest; former smoker FINDINGS: Mediastinum: Coronary artery calcifications are present. Ascending aorta at the upper limits of normal.  Severe narrowing of left upper lobe arteries. Occlusion multiple left upper lobe veins. Large left apical mass which is difficult to separate from postobstructive consolidation. The mass likely measures 6.8 x 4 cm on series 2, image 37 with adjacent postobstructive consolidation. Direct invasion into the mediastinum. Significant mediastinal lymphadenopathy. Largest conglomerate which is reproducible and separate from the mass measures 4.8 x 2.9 cm on series 2, image 32 in the prevascular space. Left axillary lymphadenopathy measuring up to 3.3 x 2.1 cm on series 2, image 35. Lungs/pleura: Occlusion of left upper lobe airways. Small left-sided pleural effusion. No suspicious nodules. Upper Abdomen: Low-density lesions in the kidneys measuring up to 4.2 cm on the left which likely represent cysts.  Soft Tissues/Bones: No suspicious

## 2019-08-28 ENCOUNTER — APPOINTMENT (OUTPATIENT)
Dept: INTERVENTIONAL RADIOLOGY/VASCULAR | Age: 74
DRG: 981 | End: 2019-08-28
Attending: INTERNAL MEDICINE
Payer: MEDICARE

## 2019-08-28 VITALS
SYSTOLIC BLOOD PRESSURE: 120 MMHG | DIASTOLIC BLOOD PRESSURE: 77 MMHG | WEIGHT: 163.8 LBS | RESPIRATION RATE: 16 BRPM | OXYGEN SATURATION: 95 % | BODY MASS INDEX: 30.93 KG/M2 | HEIGHT: 61 IN | TEMPERATURE: 97.5 F | HEART RATE: 69 BPM

## 2019-08-28 LAB
ANION GAP SERPL CALCULATED.3IONS-SCNC: 12 MMOL/L (ref 3–16)
BASOPHILS ABSOLUTE: 0 K/UL (ref 0–0.2)
BASOPHILS RELATIVE PERCENT: 0 %
BUN BLDV-MCNC: 45 MG/DL (ref 7–20)
CALCIUM SERPL-MCNC: 8.9 MG/DL (ref 8.3–10.6)
CHLORIDE BLD-SCNC: 95 MMOL/L (ref 99–110)
CO2: 23 MMOL/L (ref 21–32)
CREAT SERPL-MCNC: 1.1 MG/DL (ref 0.6–1.2)
EOSINOPHILS ABSOLUTE: 0 K/UL (ref 0–0.6)
EOSINOPHILS RELATIVE PERCENT: 0 %
GFR AFRICAN AMERICAN: 59
GFR NON-AFRICAN AMERICAN: 49
GLUCOSE BLD-MCNC: 137 MG/DL (ref 70–99)
GLUCOSE BLD-MCNC: 165 MG/DL (ref 70–99)
GLUCOSE BLD-MCNC: 196 MG/DL (ref 70–99)
GLUCOSE BLD-MCNC: 230 MG/DL (ref 70–99)
HCT VFR BLD CALC: 39.6 % (ref 36–48)
HEMOGLOBIN: 13.1 G/DL (ref 12–16)
LYMPHOCYTES ABSOLUTE: 2.1 K/UL (ref 1–5.1)
LYMPHOCYTES RELATIVE PERCENT: 18 %
MCH RBC QN AUTO: 29.6 PG (ref 26–34)
MCHC RBC AUTO-ENTMCNC: 33.1 G/DL (ref 31–36)
MCV RBC AUTO: 89.4 FL (ref 80–100)
MONOCYTES ABSOLUTE: 0.8 K/UL (ref 0–1.3)
MONOCYTES RELATIVE PERCENT: 7 %
NEUTROPHILS ABSOLUTE: 8.6 K/UL (ref 1.7–7.7)
NEUTROPHILS RELATIVE PERCENT: 75 %
PDW BLD-RTO: 15.2 % (ref 12.4–15.4)
PERFORMED ON: ABNORMAL
PLATELET # BLD: 365 K/UL (ref 135–450)
PMV BLD AUTO: 7.9 FL (ref 5–10.5)
POTASSIUM REFLEX MAGNESIUM: 4.8 MMOL/L (ref 3.5–5.1)
RBC # BLD: 4.43 M/UL (ref 4–5.2)
SODIUM BLD-SCNC: 130 MMOL/L (ref 136–145)
WBC # BLD: 11.4 K/UL (ref 4–11)

## 2019-08-28 PROCEDURE — C1788 PORT, INDWELLING, IMP: HCPCS

## 2019-08-28 PROCEDURE — 99152 MOD SED SAME PHYS/QHP 5/>YRS: CPT | Performed by: RADIOLOGY

## 2019-08-28 PROCEDURE — 2580000003 HC RX 258

## 2019-08-28 PROCEDURE — 6370000000 HC RX 637 (ALT 250 FOR IP): Performed by: STUDENT IN AN ORGANIZED HEALTH CARE EDUCATION/TRAINING PROGRAM

## 2019-08-28 PROCEDURE — 02H633Z INSERTION OF INFUSION DEVICE INTO RIGHT ATRIUM, PERCUTANEOUS APPROACH: ICD-10-PCS | Performed by: RADIOLOGY

## 2019-08-28 PROCEDURE — C1894 INTRO/SHEATH, NON-LASER: HCPCS

## 2019-08-28 PROCEDURE — 99153 MOD SED SAME PHYS/QHP EA: CPT | Performed by: RADIOLOGY

## 2019-08-28 PROCEDURE — 2500000003 HC RX 250 WO HCPCS

## 2019-08-28 PROCEDURE — 6360000002 HC RX W HCPCS

## 2019-08-28 PROCEDURE — 6360000002 HC RX W HCPCS: Performed by: STUDENT IN AN ORGANIZED HEALTH CARE EDUCATION/TRAINING PROGRAM

## 2019-08-28 PROCEDURE — 0JH60WZ INSERTION OF TOTALLY IMPLANTABLE VASCULAR ACCESS DEVICE INTO CHEST SUBCUTANEOUS TISSUE AND FASCIA, OPEN APPROACH: ICD-10-PCS | Performed by: RADIOLOGY

## 2019-08-28 PROCEDURE — 2580000003 HC RX 258: Performed by: STUDENT IN AN ORGANIZED HEALTH CARE EDUCATION/TRAINING PROGRAM

## 2019-08-28 PROCEDURE — 80048 BASIC METABOLIC PNL TOTAL CA: CPT

## 2019-08-28 PROCEDURE — 85025 COMPLETE CBC W/AUTO DIFF WBC: CPT

## 2019-08-28 PROCEDURE — 36561 INSERT TUNNELED CV CATH: CPT | Performed by: RADIOLOGY

## 2019-08-28 PROCEDURE — 36415 COLL VENOUS BLD VENIPUNCTURE: CPT

## 2019-08-28 PROCEDURE — 77001 FLUOROGUIDE FOR VEIN DEVICE: CPT | Performed by: RADIOLOGY

## 2019-08-28 RX ORDER — DEXAMETHASONE 4 MG/1
4 TABLET ORAL EVERY 6 HOURS
Qty: 40 TABLET | Refills: 0 | Status: SHIPPED | OUTPATIENT
Start: 2019-08-28 | End: 2019-09-07

## 2019-08-28 RX ORDER — PANTOPRAZOLE SODIUM 40 MG/1
40 TABLET, DELAYED RELEASE ORAL
Qty: 30 TABLET | Refills: 3 | Status: SHIPPED | OUTPATIENT
Start: 2019-08-29

## 2019-08-28 RX ADMIN — LISINOPRIL 30 MG: 20 TABLET ORAL at 07:56

## 2019-08-28 RX ADMIN — CITALOPRAM HYDROBROMIDE 20 MG: 20 TABLET ORAL at 07:56

## 2019-08-28 RX ADMIN — AMLODIPINE BESYLATE 10 MG: 10 TABLET ORAL at 07:56

## 2019-08-28 RX ADMIN — MEMANTINE HYDROCHLORIDE 5 MG: 5 TABLET ORAL at 07:55

## 2019-08-28 RX ADMIN — METOPROLOL TARTRATE 100 MG: 100 TABLET ORAL at 07:56

## 2019-08-28 RX ADMIN — PANTOPRAZOLE SODIUM 40 MG: 20 TABLET, DELAYED RELEASE ORAL at 05:56

## 2019-08-28 RX ADMIN — INSULIN LISPRO 3 UNITS: 100 INJECTION, SOLUTION INTRAVENOUS; SUBCUTANEOUS at 07:57

## 2019-08-28 RX ADMIN — LEVETIRACETAM 500 MG: 500 TABLET, FILM COATED ORAL at 07:56

## 2019-08-28 RX ADMIN — Medication 10 ML: at 07:56

## 2019-08-28 RX ADMIN — DEXAMETHASONE 4 MG: 4 TABLET ORAL at 10:00

## 2019-08-28 RX ADMIN — DEXAMETHASONE 4 MG: 4 TABLET ORAL at 04:25

## 2019-08-28 RX ADMIN — DEXAMETHASONE 4 MG: 4 TABLET ORAL at 15:13

## 2019-08-28 RX ADMIN — INSULIN LISPRO 6 UNITS: 100 INJECTION, SOLUTION INTRAVENOUS; SUBCUTANEOUS at 12:19

## 2019-08-28 ASSESSMENT — ENCOUNTER SYMPTOMS
EYES NEGATIVE: 1
GASTROINTESTINAL NEGATIVE: 1
COUGH: 1
ALLERGIC/IMMUNOLOGIC NEGATIVE: 1

## 2019-08-28 ASSESSMENT — PAIN SCALES - GENERAL: PAINLEVEL_OUTOF10: 0

## 2019-08-28 NOTE — H&P
Patient:  Ray Gaines   :   1945      Relevant clinical history, particularly as it involves the pending procedure, was reviewed and discussed. The procedure including risks and benefits was discussed at length with the patient (or designated family member) and all questions were answered. Informed consent to proceed with the procedure was given. Vital signs were monitored and documented by the Radiology nurse. Targeted physical examination  Heart : regular rate and rhythm  Lungs : clear, breathing easily  Condition : stable    [facility] nurses notes reviewed and agreed. Past Medical History:        Diagnosis Date    Arthritis     Dementia     Depression     Eczema     Hyperlipidemia     Hypertension     [facility]    Osteoarthritis     Osteoporosis        Past Surgical History:           Procedure Laterality Date    TONSILLECTOMY         Allergies:  Patient has no known allergies. Medications:   Home Meds  No current facility-administered medications on file prior to encounter.       Current Outpatient Medications on File Prior to Encounter   Medication Sig Dispense Refill    traZODone (DESYREL) 50 MG tablet Take 1/2 tab by mouth at night, if does not help take 1 tab at night for sleep 90 tablet 1    amLODIPine (NORVASC) 10 MG tablet Take 1 tablet by mouth daily 90 tablet 1    citalopram (CELEXA) 20 MG tablet TAKE 1 TABLET EVERY DAY 90 tablet 1    lisinopril (PRINIVIL;ZESTRIL) 20 MG tablet Take 1 tablet by mouth daily 90 tablet 1    metoprolol (LOPRESSOR) 100 MG tablet TAKE 1 TABLET EVERY DAY 90 tablet 1    simvastatin (ZOCOR) 40 MG tablet Take 40 mg by mouth nightly      alendronate (FOSAMAX) 70 MG tablet Take 1 tablet by mouth every 7 days With a glass of water before first food/med/drink,avoid lying down for 30min 12 tablet 1    donepezil (ARICEPT) 5 MG tablet Take 1 tablet by mouth nightly 90 tablet 3   

## 2019-08-28 NOTE — PROGRESS NOTES
Discharge instructions covered with patient and . All questions answered. Prescriptions given to patient to be filled. PIV removed with no complications. Patient wheeled to spouse's car with all belongings.

## 2019-08-28 NOTE — PLAN OF CARE
Problem: Falls - Risk of:  Goal: Will remain free from falls  Description  Will remain free from falls  8/28/2019 0119 by Agustín Summers RN  Outcome: Ongoing  Note:   Hourly rounding on patient for needs. Non-skid socks on, bed in lowest position and locked. Bedside table, personal belongs, and nurse call light within reach. Instructed patient to use call light for assistance. Bed alarm on, camera in use for safety. Floor clear of clutter. Patient remains free of falls at this time. Will continue to monitor. Problem: Pain:  Goal: Patient's pain/discomfort is manageable  Description  Patient's pain/discomfort is manageable  Outcome: Ongoing  Note:   Pt denies pain at this time, pt exhibits no objective characteristics of pain, will continue to monitor.

## 2019-08-28 NOTE — PROGRESS NOTES
shows a large completely hypoechoic node measuring 1.8 x 2.5 cm, likely metastatic. Left breast mass with suspected axillary node metastasis    Mri Brain Wo Contrast    Result Date: 8/23/2019  EXAM: MRI BRAIN WO CONTRAST INDICATION: Brain metastasis, lung mass COMPARISON: MRI dated 2015 and CT dated 8/21/2019. TECHNIQUE: Multiplanar, multisequence MR imaging of the head obtained without IV contrast. IV contrast: None. FINDINGS: Study degraded by motion artifacts. There is no restricted diffusion. There is no acute intracranial hemorrhage. There are multiple scattered focal lesions of varying sizes in the supratentorial and infratentorial brain with perilesional edema. There is diffuse parenchymal volume loss. There is no midline shift. Midline structures demonstrate normal morphology. Cervicomedullary junction is unremarkable. Partially opacified right mastoid air cells. Bilateral orbits are within normal limits. Multiple scattered focal lesions in the supratentorial and infratentorial brain with perilesional edema consistent with metastasis. Us Breast Biopsy W Loc Device 1st Lesion Left    Result Date: 8/23/2019  Ultrasound-guided core needle biopsy left breast, portable HISTORY: Breast mass Procedure performed by Dr. Deon Runner at the bedside. Lobulated mass in the upper outer quadrant redemonstrated. Skin prepped in sterile manner and generous local anesthesia administered. 11-gauge Celero needle was advanced towards the lesion using ultrasound guidance. Core biopsy was performed x2 with the cutting needle traversing the lesion with each pass. Samples sent for analysis Patient tolerated procedure well. Following biopsy, manual compression over the biopsy site for 5 minutes. Uneventful core needle biopsy left breast      ASSESSMENT AND PLAN:   Yue Lind is a 69 y.o. female, who has a new diagnosis of metastatic small cell lung cancer.   Extent of disease involves a left upper lobe primary with

## 2019-08-28 NOTE — PROGRESS NOTES
Resident Progress Note  PGY-1    Admit Date: 2019    PCP: CHRISTINA Avery - ABHAY                  : 1945  MRN: [id number]    Subjective: Interval History:     No acute events overnight. Patient seen at bedside this morning. Patient feeling tired this morning not as up beat as previous days. She is still waiting for her Port-A Cath placement that should happen this afternoon. The patient denies fevers, chills, chest pain, shortness of breath, nausea, vomiting, diarrhea, or constipation. Diet: Diet NPO Effective Now Exceptions are: Sips with Meds    Data:     Scheduled Meds:   insulin lispro  0-18 Units Subcutaneous TID WC    insulin lispro  0-9 Units Subcutaneous Nightly    lisinopril  30 mg Oral Daily    dexamethasone  4 mg Oral Q6H    pantoprazole  40 mg Oral QAM AC    senna  1 tablet Oral Nightly    amLODIPine  10 mg Oral Daily    citalopram  20 mg Oral Daily    metoprolol  100 mg Oral Daily    traZODone  25 mg Oral Nightly    sodium chloride flush  10 mL Intravenous 2 times per day    donepezil  5 mg Oral Nightly    memantine  5 mg Oral BID    simvastatin  40 mg Oral Nightly    levETIRAcetam  500 mg Oral BID     Continuous Infusions:   dextrose       PRN Meds:glucose, dextrose, glucagon (rDNA), dextrose, sodium chloride flush, ondansetron, polyethylene glycol, acetaminophen  I/O last 3 completed shifts: In: 620 [P.O.:600; I.V.:20]  Out: -   No intake/output data recorded. Intake/Output Summary (Last 24 hours) at 2019 1247  Last data filed at 2019 2123  Gross per 24 hour   Intake 490 ml   Output --   Net 490 ml       Objective:     Vitals: /79   Pulse 66   Temp 98.7 °F (37.1 °C) (Oral)   Resp 16   Ht 5' 1\" (1.549 m)   Wt 163 lb 12.8 oz (74.3 kg)   LMP  (LMP Unknown)   SpO2 95%   BMI 30.95 kg/m²     Physical Exam   Constitutional: No distress. HENT:   Head: Normocephalic and atraumatic. Eyes: Pupils are equal, round, and reactive to light. PO2ART    INR: No results for input(s): INR in the last 72 hours. U/A:No results for input(s): NITRITE, COLORU, PHUR, LABCAST, WBCUA, RBCUA, MUCUS, TRICHOMONAS, YEAST, BACTERIA, CLARITYU, SPECGRAV, LEUKOCYTESUR, UROBILINOGEN, BILIRUBINUR, BLOODU, GLUCOSEU, AMORPHOUS in the last 72 hours. Invalid input(s): Komal Medina   -----------------------------------------------------------------  RAD:   US BREAST BIOPSY W LOC DEVICE 1ST LESION LEFT   Final Result      Uneventful core needle biopsy left breast      US BREAST LIMITED LEFT   Final Result      Left breast mass with suspected axillary node metastasis      MRI BRAIN WO CONTRAST   Final Result      Multiple scattered focal lesions in the supratentorial and infratentorial brain with perilesional edema consistent with metastasis. CT ABDOMEN PELVIS W IV CONTRAST Additional Contrast? None   Final Result      1. New verona hepatis, portacaval, and left inguinal lymphadenopathy consistent with metastatic disease. 2. New right paracolic nodule consistent with metastatic disease. 3. Left axillary metastatic lymphadenopathy also visualized from the CT chest from 8/21/2019 and incompletely included in field-of-view limiting comparison for change in size. 4. Posterior mediastinal metastatic lymphadenopathy incompletely included in the field-of-view. 5. Small left pleural effusion. 6. Irregularity of the endplates of the lower lumbar spine, favored to be related to degenerative remodeling.             IR PORT PLACEMENT > 5 YEARS    (Results Pending)         Assessment/Plan:   69 Y old F with pertinent history of advanced Alzheimer's dementia, apical left lung mass with multiple metastases to brain with progressively worsening left upper extremity weakness that started 8/19.     Metastatic lung carcinoma:- Long-term smoker. CT abdomen and pelvis: new verona hepatis, portacaval, and left inguinal lymphadenopathy, new right paracolic nodule, left axillary

## 2019-08-28 NOTE — PROCEDURES
IR Brief Postoperative Note    Erica Nguyễn  YOB: 1945  [id number]    Pre-operative Diagnosis: lung cancer    Post-operative Diagnosis: Same    Procedure: right IJ port, ok for use    Anesthesia: moderate    Surgeons/Assistants: stacy    Estimated Blood Loss: Minimal    Complications: none    Specimens: were not obtained    See full procedure dictation to follow      Enio Bautista MD  8/28/2019

## 2019-08-28 NOTE — DISCHARGE INSTR - COC
Continuity of Care Form    Patient Name: Lucinda Bourgeois   :  1945  MRN:  [id number]    Admit date:  2019  Discharge date:  ***    Code Status Order: DNR-CCA   Advance Directives:   Advance Care Flowsheet Documentation     Date/Time Healthcare Directive Type of Healthcare Directive Copy in [address] Agent's Name Healthcare Agent's Phone Number    [phone]  Yes, patient has an advance directive for healthcare treatment  Durable power of  for health care; Health care treatment directive  No, copy requested from family  --  --  --          Admitting Physician:  Taylor Garza MD  PCP: Lizeth Saeed, CHRISTINA - CNP    Discharging Nurse: [facility] Unit/Room#: 8561/6191-28  Discharging Unit Phone Number: ***    Emergency Contact:   Extended Emergency Contact Information  Primary Emergency Contact: Johnny November  Address: [address] [place] [address] [address] [facility] Phone: [phone]  Mobile Phone: [phone]  Relation: Spouse  Secondary Emergency Contact: [address] [place] [address] Phone: [phone]  Relation: Child    Past Surgical History:  Past Surgical History:   Procedure Laterality Date    TONSILLECTOMY         Immunization History:   Immunization History   Administered Date(s) Administered    Influenza Vaccine, unspecified formulation 2016    Influenza, High Dose (Fluzone 65 yrs and older) 2017, 11/15/2018    Pneumococcal Conjugate 13-valent (Christa Formica) 10/03/2015    Pneumococcal Polysaccharide ([id number]) 2014, 2016    Tdap (Boostrix, Adacel) 2019    Zoster Live (Zostavax) 2013       Active Problems:  Patient Active Problem List   Diagnosis Code    Hyperlipidemia E78.5    OA (osteoarthritis) M19.90    Tobacco use Z72.0    Essential hypertension I10    Dysthymia F34.1    Alzheimer's disease G30.9, F02.80    Late onset Alzheimer's disease with behavioral

## 2019-08-30 ENCOUNTER — TELEPHONE (OUTPATIENT)
Dept: FAMILY MEDICINE CLINIC | Age: 74
End: 2019-08-30

## 2019-08-30 DIAGNOSIS — C34.90 LUNG CANCER METASTATIC TO BRAIN (HCC): Primary | ICD-10-CM

## 2019-08-30 DIAGNOSIS — C79.31 LUNG CANCER METASTATIC TO BRAIN (HCC): Primary | ICD-10-CM

## 2019-08-30 DIAGNOSIS — G30.1 LATE ONSET ALZHEIMER'S DISEASE WITH BEHAVIORAL DISTURBANCE (HCC): ICD-10-CM

## 2019-08-30 DIAGNOSIS — F02.818 LATE ONSET ALZHEIMER'S DISEASE WITH BEHAVIORAL DISTURBANCE (HCC): ICD-10-CM
